# Patient Record
Sex: FEMALE | Race: WHITE | NOT HISPANIC OR LATINO | Employment: UNEMPLOYED | URBAN - METROPOLITAN AREA
[De-identification: names, ages, dates, MRNs, and addresses within clinical notes are randomized per-mention and may not be internally consistent; named-entity substitution may affect disease eponyms.]

---

## 2017-08-15 ENCOUNTER — ALLSCRIPTS OFFICE VISIT (OUTPATIENT)
Dept: OTHER | Facility: OTHER | Age: 32
End: 2017-08-15

## 2017-08-21 ENCOUNTER — LAB CONVERSION - ENCOUNTER (OUTPATIENT)
Dept: OTHER | Facility: OTHER | Age: 32
End: 2017-08-21

## 2017-08-21 LAB
ADDITIONAL INFORMATION (HISTORICAL): NORMAL
ADEQUACY: (HISTORICAL): NORMAL
COMMENT (HISTORICAL): NORMAL
CYTOTECHNOLOGIST: (HISTORICAL): NORMAL
HPV MRNA E6/E7 (HISTORICAL): NOT DETECTED
INTERPRETATION (HISTORICAL): NORMAL
LMP (HISTORICAL): NORMAL
PREV. BX: (HISTORICAL): NORMAL
PREV. PAP (HISTORICAL): NORMAL
SOURCE (HISTORICAL): NORMAL

## 2017-09-13 ENCOUNTER — GENERIC CONVERSION - ENCOUNTER (OUTPATIENT)
Dept: OTHER | Facility: OTHER | Age: 32
End: 2017-09-13

## 2017-09-14 ENCOUNTER — GENERIC CONVERSION - ENCOUNTER (OUTPATIENT)
Dept: OTHER | Facility: OTHER | Age: 32
End: 2017-09-14

## 2017-09-26 ENCOUNTER — HOSPITAL ENCOUNTER (OUTPATIENT)
Facility: HOSPITAL | Age: 32
Setting detail: OUTPATIENT SURGERY
Discharge: HOME | End: 2017-09-26
Attending: SURGERY | Admitting: SURGERY
Payer: COMMERCIAL

## 2017-09-26 VITALS
BODY MASS INDEX: 19.84 KG/M2 | RESPIRATION RATE: 16 BRPM | SYSTOLIC BLOOD PRESSURE: 104 MMHG | HEIGHT: 63 IN | TEMPERATURE: 99.1 F | OXYGEN SATURATION: 100 % | HEART RATE: 57 BPM | WEIGHT: 112 LBS | DIASTOLIC BLOOD PRESSURE: 73 MMHG

## 2017-09-26 DIAGNOSIS — D03.72 MELANOMA IN SITU OF LEFT LOWER EXTREMITY INCLUDING HIP (HCC): ICD-10-CM

## 2017-09-26 PROCEDURE — 88305 TISSUE EXAM BY PATHOLOGIST: CPT | Performed by: SURGERY

## 2017-09-26 RX ORDER — KETOROLAC TROMETHAMINE 10 MG/1
10 TABLET, FILM COATED ORAL ONCE
Status: COMPLETED | OUTPATIENT
Start: 2017-09-26 | End: 2017-09-26

## 2017-09-26 RX ADMIN — KETOROLAC TROMETHAMINE 10 MG: 10 TABLET, FILM COATED ORAL at 11:07

## 2017-09-26 NOTE — OP NOTE
OPERATIVE REPORT  PATIENT NAME: Diane Park    :  1985  MRN: [id number]  Pt Location: BE OR ROOM 06    SURGERY DATE: 2017    Surgeon(s) and Role:     * Vidya Anguiano MD - Primary     * Theodora Hitchcock MD - Assisting    Preop Diagnosis:  Melanoma in situ of left lower extremity including hip [D03 72]    Post-Op Diagnosis Codes:     * Melanoma in situ of left lower extremity including hip [D03 72]    Procedure(s) (LRB):  SHIN WIDE EXCISION MELANOMA SCAR (Left)    Specimen(s):  ID Type Source Tests Collected by Time Destination   1 : Left Shin Melanoma Resection  Tissue Leg, Left TISSUE EXAM Vidya Anguiano MD 2017 8105    2 : Left Leg Melanoma Resection- Final 6:00  Tissue Leg, Left TISSUE EXAM Vidya Anguiano MD 2017 1015    3 : Left Shin Melanoma Resection- Final 12:00 Tissue Leg, Left TISSUE EXAM Vidya Anguiano MD 2017 1016        Estimated Blood Loss:   Minimal    Drains:       Anesthesia Type:   Local    Operative Indications:  Melanoma in situ of left lower extremity including hip [D03 72]  Left anterolateral chin    Operative Findings:  Defect post excision: 5 0 x 2 0 x 0 5 cm    Complications:   None    Procedure and Technique:  Patient is a 69-year-old woman with a melanoma in situ on her left anterolateral shin  She comes for wide excision  She was brought into the OR and identified by a proper time-out  Following this she was prepped and draped with the left foot and shin exposed  Following this 5 mm margins were drawn around the visible lesion  The area was then thoroughly infiltrated with local anesthesia  The lines were incised sharply  Cautery was used dissect through the dermis and subcutaneous tissue to obtain a full-thickness excision  The specimen was oriented with sutures and clips  Resulting defect measured 5 0 x 2 0 x 0 5 cm in dimension    Closure was performed by undermining the skin, then using 3 0 Vicryl to close the dermis, followed by a 2 0 nylon to close the skin in vertical mattress fashion, followed by 4 0 Monocryl to close the skin in subcuticular fashion  Benzoin and Steri-Strips were then applied followed by dressings  Patient was then awakened and transferred to the recovery room in stable condition     I was present for the entire procedure    Patient Disposition:  PACU     SIGNATURE: Elizabeth Ramesh MD  DATE: September 26, 2017  TIME: 11:08 AM

## 2017-09-26 NOTE — DISCHARGE INSTRUCTIONS
Your appointment for Dr Maribel Cifuentes is 10/10/17 8:45am for suture removal  No strenuous activity until your office appointment   Ok to take advil or tylenol for pain  Ok to shower, remove outer dressing in two days  The white steri-strips underneath will come off on their own  The black suture will be removed in the office

## 2017-10-10 ENCOUNTER — GENERIC CONVERSION - ENCOUNTER (OUTPATIENT)
Dept: OTHER | Facility: OTHER | Age: 32
End: 2017-10-10

## 2018-01-09 NOTE — PROGRESS NOTES
2016         RE: Alfredo Fresh                          To: [facility]   MR#: [id number]                                  Eli Masterson   : [place]: [id number]                             Fax: [phone]   (Exam #: [id number])      The LMP of this 31year old,  G1, P0-0-0-0 patient was unknown, her   working NEFTALY is OCT 24 2016 and the current gestational age is 22 weeks 3   days by IV Fertilization  A sonographic examination was performed on 2016 using real time equipment  The ultrasound examination was   performed using abdominal technique  The patient has a BMI of 24 1  Her   blood pressure today was 105/67  Charissa Ket has no complaints  She reports fetal movements  She denies vaginal   bleeding or problems related to  labor  Cardiac motion was observed at 148 bpm       INDICATIONS      in vitro fertilization      Exam Types      Fetal Echocardiogram      RESULTS      Fetus # 1 of 1   Breech presentation   Placenta Location = Anterior, fundal   No placenta previa   Placenta Grade = I      AMNIOTIC FLUID         Largest Vertical Pocket = 4 3 cm   Amniotic Fluid: Normal      FETAL VESSELS                                     S/D   PI    RI    PSV   AEDV RF                                                    cm/s       Umbilical Artery                 0 85       Middle Cerebral Artery           1 82      FETAL VESSELS                                    PVSys PVDia PASys  S/D   S/A   DVI   RF       Ductus Venosus:                                                No      IMPRESSION      [facility]man IUP   22 weeks and 3 days by IV Fertilization  (NEFTALY=OCT 21 2016)   Breech presentation   Regular fetal heart rate of 148 bpm   Anterior, fundal placenta   No placenta previa      GENERAL COMMENT      Fetal echocardiography was performed for the indication of in vitro   fertilization    The heart is in normal anatomic position within the left   chest   All four chambers were identified with a normal, 45-degree   leftward axis  There is no suspicion of an echogenic intracardiac focus  Tricuspid regurgitation is not present  Right and left ventricular and   atrial sizes were concordant  The ventricular and atrial septi appear   intact by 2D echo and color Doppler  The aorta arises in normal anatomic   relationship from the left ventricle  The pulmonary artery arises in   normal anatomic relationship from the right ventricle  The short axis and   three vessel views appear normal   The ductus arterosis joins the aorta in   a normal anatomic relationship  The aortic arch, pulmonary veins,   inferior and superior vena cava, and the right and left ventricular   outflow tracts were identified and appear normal  The flow velocities   across the mitral, tricuspid, aortic, pulmonary valves, aortic arch and   ductus arteriosus, appear normal  There is no evidence of an anatomical   defect present within the heart  The fetal heart rate was regular   throughout the exam period  There is no suspicion of a fetal dysrhythmia  The umbilical and middle cerebral artery, and ductus venosus, Doppler   studies are normal       Today's ultrasound findings were discussed in detail with [facility]  She   was advised of the findings and counseled about the limitations of fetal   echocardiography in detecting all forms of congenital anomalies  For   example, fetal echocardiography may not detect small septal defects and   minor valvular abnormalities  Other examples of difficult    diagnosis include post valvular stenosis, coarctation of the aorta, and   anomalous pulmonary venous return  [facility] will return to the [address] [place] [address] at about 28 weeks gestation to assess interval growth        The face to face time, in addition to time spent discussing ultrasound   results, was 10 minutes, greater than 50% of which was spent during   counseling and coordination of care  VLAD Jain M D     Maternal-Fetal Medicine   Electronically signed 06/20/16 15:39

## 2018-01-10 NOTE — RESULT NOTES
Verified Results  (1) CBC/PLT/DIFF [id number] 09:40AM [place]     Test Name Result Flag Reference   WHITE BLOOD CELL COUNT 8 5 Thousand/uL  3 8-10 8   RED BLOOD CELL COUNT 4 12 Million/uL  3 80-5 10   HEMOGLOBIN 13 1 g/dL  11 7-15 5   HEMATOCRIT 40 5 %  35 0-45 0   MCV 98 5 fL  80 0-100 0   MCH 31 7 pg  27 0-33 0   MCHC 32 2 g/dL  32 0-36 0   RDW 14 3 %  11 0-15 0   PLATELET COUNT 640 Thousand/uL  140-400   MPV 10 8 fL  7 5-11 5   ABSOLUTE NEUTROPHILS 6554 cells/uL  1140-8241   ABSOLUTE LYMPHOCYTES 1420 cells/uL  850-3900   ABSOLUTE MONOCYTES 459 cells/uL  200-950   ABSOLUTE EOSINOPHILS 51 cells/uL     ABSOLUTE BASOPHILS 17 cells/uL  0-200   NEUTROPHILS 77 1 %     LYMPHOCYTES 16 7 %     MONOCYTES 5 4 %     EOSINOPHILS 0 6 %     BASOPHILS 0 2 %       (Q) GLUCOSE, GESTATIONAL SCREEN (50G)-130 CUTOFF [id number] 09:40AM [place]     Test Name Result Flag Reference   GLUCOSE, GESTATIONAL$SCREEN (50G)-130 CUTOFF 83 mg/dL  <135     (Q) RPR (MONITOR) W/REFL TITER [id number] 09:40AM Silvestre Hurtado   REPORT COMMENT:  FASTING:NO     Test Name Result Flag Reference   RPR (MONITOR)$W/REFL TITER NON-REACTIVE  NON-REACTIVE

## 2018-01-10 NOTE — PROGRESS NOTES
SEP 9 2016         RE: Nichol Lies                          To: [facility]   MR#: [id number]                                  5 danica [facility]   : [address] [place] [address] Avenue: [id number]                             Fax: [phone]   (Exam #: [id number])      The LMP of this 31year old,  G1, P0-0-0-0 patient was unknown, her   working NEFTALY is OCT 24 2016 and the current gestational age is 34 weeks 0   days by IV Fertilization  A sonographic examination was performed on SEP 9   2016 using real time equipment  The ultrasound examination was performed   using abdominal technique  The patient has a BMI of 24 8  Her blood   pressure today was 104/61  IVF pregnancy with an implantation date of a frozen embryo on 16   giving an NEFTALY of 10/21/16  She reports she had preimplantation genetics  Problem list:   1  History of an IVF pregnancy   2  History of ITP in  that resolved  Laast Plt count was 148,000 on   16  Cardiac motion was observed at 134 bpm       INDICATIONS      in vitro fertilization      Exam Types      Level I      RESULTS      Fetus # 1 of 1   Vertex presentation   Fetal growth appeared normal   Placenta Location = Anterior   No placenta previa   Placenta Grade = I      MEASUREMENTS (* Included In Average GA)      AC              30 5 cm        34 weeks 4 days* (58%)   BPD              8 7 cm        35 weeks 0 days* (63%)   HC              32 2 cm        35 weeks 6 days* (66%)   Femur            6 4 cm        33 weeks 0 days* (37%)      HC/AC           1 06   FL/AC           0 21   FL/BPD          0 74   EFW (Ac/Fl/Hc)  2417 grams - 5 lbs 5 oz                 (49%)      THE AVERAGE GESTATIONAL AGE is 34 weeks 4 days +/- 21 days        AMNIOTIC FLUID      Q1: 3 4      Q2: 4 3      Q3: 4 5      Q4: 3 9   JOSLYN Total = 16 1 cm   Amniotic Fluid: Normal      ANATOMY DETAILS      Visualized Appearing Sonographically Normal:   HEAD: (Calvarium, BPD Level, Cavum, Lateral Ventricles, Choroid Plexus);      TH  CAV : (Diaphragm); HEART: (Four Chamber View, Proximal Left   Outflow, 3 Vessel Trachea, Interventricular Septum, Interatrial Septum,   Cardiac Axis, Cardiac Position);    ABD  CAV , STOMACH, RIGHT KIDNEY, LEFT   KIDNEY, BLADDER, PLACENTA      Suboptimally Visualized:   HEAD: (Cerebellum, Cisterna Magna); HEART: (Proximal Right Outflow)      IMPRESSION      Harman IUP   34 weeks and 4 days by this ultrasound  (NEFTALY=OCT 17 2016)   34 weeks and 0 days by IV Fertilization  (NEFTALY=OCT 21 2016)   Vertex presentation   Fetal growth appeared normal   Regular fetal heart rate of 134 bpm   Anterior placenta   No placenta previa      GENERAL COMMENT      On exam today the patient appears well, in no acute distress, and denies   any complaints  Her abdomen is non-tender  There has been appropriate interval fetal growth  Good fetal movement and   tone are seen  The amniotic fluid volume appears normal   The placenta is   anterior and it appears sonographically normal   The anatomic structures   listed above could not be optimally imaged today because of fetal   position; however, these structures were seen on a prior ultrasound and   appeared sonographically normal   The patient was informed of today's   findings and all of her questions were answered  The limitations of   ultrasound were reviewed with the patient   labor precautions as   well as fetal kick counts were reviewed  The patient had questions regarding the safety of exercise in pregnancy   and overall I reviewed that exercise is safe and encouraged in pregnancy   as long as there are no complications such as fetal growth restriction or    labor    I reiterated that there is no evidence to suggest that a   mom needs to keep her heart rate bellow a certain level for fetal well   being and that moderate exercise is beneficial for both maternal and fetal   well being  Recommend further ultrasounds as clinically indicated  Thank you very much for allowing us to participate in the care of this   very nice patient  Should you have any questions, please do not hesitate   to contact our office  Please note, in addition to the time spent discussing the results of the   ultrasound, I spent approximately 10 minutes of face-to-face time with the   patient, greater than 50% of which was spent in counseling and the   coordination of care for this patient  VLAD King M D     Electronically signed 09/12/16 07:54

## 2018-01-11 NOTE — PROGRESS NOTES
JUL 15 2016         RE: Jimi Kasper                          To: [facility]   MR#: [id number]                                  5 danica Masterson   : [address]: [id number]                             Fax: [phone]   (Exam #: [id number])      The LMP of this 31year old,  G1, P0-0-0-0 patient was unknown, her   working NEFTALY is OCT 24 2016 and the current gestational age is 26 weeks 0   days by IV Fertilization  A sonographic examination was performed on JUL   15 2016 using real time equipment  The ultrasound examination was   performed using abdominal technique  The patient has a BMI of 24 3  Her   blood pressure today was 99/60  IVF pregnancy with an implantation date of a frozen embryo on 16   giving an NEFTALY of 10/21/16  She reports she had preimplantation genetics  Problem list:   1  History of an IVF pregnancy   2  History of ITP in  that resolved  Laast Plt count was 148,000 on   16  Cardiac motion was observed at 145 bpm       INDICATIONS      in vitro fertilization      Exam Types      Level I      RESULTS      Fetus # 1 of 1   Vertex presentation   Fetal growth appeared normal   Placenta Location = Anterior   No placenta previa   Placenta Grade = I      MEASUREMENTS (* Included In Average GA)      AC              22 2 cm        26 weeks 4 days* (56%)   BPD              7 0 cm        28 weeks 1 day * (88%)   HC              25 4 cm        27 weeks 2 days* (67%)   Femur            4 9 cm        26 weeks 4 days* (50%)      Cerebellum       3 0 cm        27 weeks 3 days   CisternaMagna    8 0 mm      HC/AC           1 14   FL/AC           0 22   FL/BPD          0 70   EFW (Ac/Fl/Hc)   978 grams - 2 lbs 2 oz                 (58%)      THE AVERAGE GESTATIONAL AGE is 27 weeks 1 day +/- 14 days        AMNIOTIC FLUID      Q1: 4 0      Q2: 3 7      Q3: 3 2      Q4: 4 0   JOSLYN Total = 14 9 cm   Amniotic Fluid: Normal      CERVICAL EVALUATION      SUPINE      Cervical Length: 3 90 cm      OTHER TEST RESULTS           Funneling?: No             Dynamic Changes?: No        Resp  To TFP?: No      ANATOMY DETAILS      Visualized Appearing Sonographically Normal:   HEAD: (Calvarium, BPD Level, Cavum, Lateral Ventricles, Choroid Plexus,   Cerebellum, Cisterna Magna); HEART: (Four Chamber View, Proximal Right   Outflow, Interventricular Septum, Interatrial Septum, Cardiac Axis,   Cardiac Position);    ABD  CAV , STOMACH, RIGHT KIDNEY, LEFT KIDNEY,   BLADDER, PLACENTA      IMPRESSION      Harman IUP   27 weeks and 1 day by this ultrasound  (NEFTALY=OCT 13 2016)   26 weeks and 0 days by IV Fertilization  (NEFTALY=OCT 21 2016)   Vertex presentation   Fetal growth appeared normal   Regular fetal heart rate of 145 bpm   Anterior placenta   No placenta previa      GENERAL COMMENT      An ultrasound was previously scheduled for 4 weeks  She had an episode of   pelvic pressure after intercourse that lasted between 1-2 days  She has no   other signs or symptoms now and would like to resume exercising ( running   which she was doing prior to pregnancy)  An ultrasound today   transabdominally shows a normal cervix length suggesting no evidence of    labor  We reviewed the signs and symptoms of  labor and   when to call her OB provider  At this time I feel she can resume her   running program      The face to face time, in addition to time spent discussing ultrasound   results, was 15 minutes, greater than 50% of which was spent during   counseling and coordination of care  VLAD Steward M D     Maternal-Fetal Medicine   Electronically signed 07/15/16 16:09

## 2018-01-14 NOTE — PROGRESS NOTES
Preliminary Nursing Report                Patient Information    Initial Encounter Entry Date:   2017 9:33 AM EST (Automated Transmission Automated Transmission)       Last Modified:   {Giulia Kong}              Legal Name: Bhanu [address] Number:        YOB: 1985        Age (years): 27        Gender: F        Body Mass Index (BMI): 20 kg/m2        Height: 63 in  Weight: 111 lbs (50 kgs)           Address:   [address] [place] [address]              Phone: [phone]   (consent to leave messages)        Email:        Ethnicity: Decline to State        Yazdanism:        Marital Status:        Preferred Language: English        Race: Other Race                    Patient Insurance Information        Primary Insurance Information Carrier Name: {Primary  CarrierName}           Carrier Address:   {Primary  CarrierAddress}              Carrier Phone: {Primary  CarrierPhone}          Group Number: {Primary  GroupNumber}          Policy Number: {Primary  PolicyNumber}          Insured Name: {Primary  InsuredName}          Insured : {Primary  InsuredDOB}          Relationship to Insured: {Primary  RelationshiptoInsured}           Secondary Insurance Information Carrier Name: {Secondary  CarrierName}           Carrier Address:   {Secondary  CarrierAddress}              Carrier Phone: {Secondary  CarrierPhone}          Group Number: {Secondary  GroupNumber}          Policy Number: {Secondary  PolicyNumber}          Insured Name: {Secondary  InsuredName}          Insured : {Secondary  InsuredDOB}          Relationship to Insured: {Secondary  RelationshiptoInsured}                       Health Profile   Booking #:   Isidro Cox #: [id number]               DOS: 2017    Surgery : REMOVAL OF SKIN LESION    Nichole Silver Procedures/Notes:     Surgery Risk: Minor          Precautions          Allergies    clindamycin       Dairy       Gluten Milk       Other       Seasonal       Clinical Comments: Reaction Type: , Reaction: , Severity:              Medications    CoQ-10 100 MG Oral Capsule       DHA CAPS       Prenatal TABS       Probiotic Oral Capsule       TraMADol HCl - 50 MG Oral Tablet               Conditions    Breast feeding status of mother       Encounter for annual routine gynecological examination       Hemorrhoid       Melanoma in situ of left lower extremity       Pregnancy resulting from in vitro fertilization in second trimester       Screening for STD (sexually transmitted disease)               Family History    None             Surgical History    None             Social History    Never a smoker                               Patient Instructions       Medical Procedure Risk  NPO Instructions   The day before surgery it is recommended to have a light dinner at your usual time and you are allowed a light snack early in the evening  Do not eat anything heavy or eat a big meal after 7pm  Do not eat or drink anything after midnight prior to your surgery  If you are supposed to take any of your medications, do so with a sip of water  Failure to follow these instructions can lead to an increased risk of lung complications and may result in a delay or cancellation of your procedure  If you have any questions, contact your institution for further instructions  No candy, no gum, no mints, no chewing tobacco          CoQ-10 100 MG Oral Capsule  Medication Instructions (Herbal Medications) 39, 40  Please stop the following medications one week prior to surgery  Testing Considerations       ? Pregnancy Test t  Consider a urine pregnancy test on the morning of surgery  Triggered by: Age or Facility Rec, Gender               Consultations       No recommendations for this classification               Miscellaneous Questions         Question: Are you able to walk up a flight of stairs, walk up a hill or do heavy housework WITHOUT having chest pain or shortness of breath? Answer: YES                   Allergies/Conditions/Medications Not Found        The following were not recognized by our system when generating the recommendations  Please consider if this would impact any preoperative protocols  ? Melanoma in situ of left lower extremity       ? Never a smoker       ? DHA CAPS       ? Prenatal TABS       ? TraMADol HCl - 50 MG Oral Tablet                  Appointment Information         Date:    09/26/2017        Location:    [place]        Address:           Directions:                      Footnotes revision 14      ?? Denotes a free-text entry  Legal Disclaimer: Any and all recommendations and services provided herein are designed to assist in the preoperative care of the patient  Nothing contained herein is designed to replace, eliminate or alleviate the responsibility of the attending physician to supervise and determine the patient?s preoperative care and course of treatment  Failure to provide complete, accurate information may negatively impact the system?s ability to recommend the proper preoperative protocol  THE ATTENDING PHYSICIAN IS RESPONSIBLE TO REVIEW THE SUGGESTED PREOPERATIVE PROTOCOLS/COURSE OF TREATMENT AND PRESCRIBE THE FINAL COURSE OF PREOPERATIVE TREATMENT IN CONSULTATION WITH THE PATIENT  THE ePREOP SYSTEM AND ITS MATERIALS ARE PROVIDED ? AS IS? WITHOUT WARRANTY OF ANY KIND, EXPRESS OR IMPLIED, INCLUDING, BUT NOT LIMITED TO, WARRANTIES OF PERFORMANCE OR MERCHANTABILITY OR FITNESS FOR A PARTICULAR PURPOSE  PATIENT AND PHYSICIANS HEREBY AGREE THAT THEIR USE OF THE MATERIALS AND RESOURCES ACT AS A CONSENT TO RELEASE AND WAIVE ePREOP FROM ANY AND ALL CLAIMS OF WARRANTY, TORT OR CONTRACT LAW OF ANY KIND             Electronically signed by:Roderick Donney Homans MD  Sep 20 2017 11:09AM EST

## 2018-01-15 VITALS
DIASTOLIC BLOOD PRESSURE: 68 MMHG | SYSTOLIC BLOOD PRESSURE: 104 MMHG | BODY MASS INDEX: 19.93 KG/M2 | WEIGHT: 112.5 LBS | HEIGHT: 63 IN

## 2018-01-16 NOTE — RESULT NOTES
Verified Results  (Q) HSV 1/2 IGG, HERPESELECT TYPE SPECIFIC AB [id number] 11:28AM Yuriy Pereyra     Test Name Result Flag Reference   HSV 1 IGG TYPE SPECIFIC$AB <0 90     HSV 2 IGG TYPE SPECIFIC$AB <0 90     Value          Interpretation                            -----          --------------                            <0 90          Negative                            0  90-1 10      Equivocal                            >1 10          Positive     This assay utilizes recombinant type-specific antigens  to differentiate HSV-1 from HSV-2 infections  A  positive result cannot distinguish between recent and  past infection  If recent HSV infection is suspected  but the results are negative or equivocal, the assay  should be repeated in 4-6 weeks  The performance  characteristics of the assay have not been established  for pediatric populations, immunocompromised patients,  or  screening

## 2018-01-17 NOTE — PROGRESS NOTES
APR 15 2016         RE: Nellie Funes                          To: [facility]   MR#: [id number]                                  5 Saniya Masterson   : [address]: [id number]                             Fax: [phone]   (Exam #: [id number])      The LMP of this 31year old,  1, para 0 patient was unknown, her   working NEFTALY is OCT 24 2016 and the current gestational age is 17 weeks 0   days by IV Fertilization  A sonographic examination was performed on APR   15 2016 using real time equipment  The ultrasound examination was   performed using abdominal technique  The patient has a BMI of 23 4  Her   blood pressure today was 105/58  Thank you very much for your kind referral of Nellie Funes to the   On license of [facility]  in [place] on April 15, 2016 for first trimester   ultrasound evaluation and  consult  Racquel Sharma is a 79-year-old   primigravida who is currently at 13-0/7 weeks gestation by an estimated   due date of 2016  She conceived this pregnancy via in vitro   fertilization, as managed by [facility] in [facility]  Her prenatal   course has been unremarkable so far  She experiences occasional nausea,   but no vomiting  Racquel Sharma denies vaginal bleeding  She has an occasional   nosebleed and headache  Otherwise, she feels well  By history,   preimplantation genetic diagnosis was normal  We do not have a copy of   that report available for review at the time of her visit today  Racquel Sharma has a past medical history significant for a diagnosis of ITP in   , treated with steroids  She no longer requires Hematology followup   for this indication and has not been treated for the indication of ITP for   many years  An initial platelet count obtained 11 days ago was normal at   172,000  Her past medical history is otherwise unremarkable   Her past   surgical history is significant for an ovarian cyst removal  Aleksandr Ayoub   denies tobacco, alcohol, or illicit drug use during the pregnancy  Her dad   has hypertension  The family medical history is otherwise negative with   respect to first degree relatives with hypertension, diabetes, or venous   thromboembolism  Her  has a cousin with Down syndrome  The family   genetic history is otherwise negative with respect to genetic   abnormalities, birth defects, or mental retardation  Multiple longitudinal and transverse sections revealed a denis   intrauterine pregnancy with the fetus in variable presentation  The   placenta is anterior in implantation  Cardiac motion was observed at 153 bpm       INDICATIONS      in vitro fertilization      Exam Types      Level I      RESULTS      Fetus # 1 of 1   Variable presentation   Fetal growth appeared normal      MEASUREMENTS (* Included In Average GA)      CRL              6 8 cm        12 weeks 6 days*   Nuchal Trans    1 20 mm      THE AVERAGE GESTATIONAL AGE is 12 weeks 6 days +/- 7 days  UTERINE ARTERIES                                  S/D   PI    RI    NOTCH       Left Uterine Artery              1 15       Right Uterine Artery             0 87      ANATOMY COMMENTS      Anatomic detail is limited at this gestational age  The yolk sac was not   noted  The fetal cranium appeared normal in shape and the nuchal   translucency was normal in size at 1 2 mm  The nasal bone appears to be   present  Evaluation of the spine revealed no obvious evidence for a   neural tube defect  Anatomy of the fetal thorax appeared within normal   limits  The cardiac axis appears normal  The 3 vessel tracheal views   normal  The cardiac rhythm was regular  Within the abdomen, stomach &   bladder were visualized and the abdominal wall appeared intact  A three   vessel cord appears to be present  Active movement of the fetal body &   extremities was seen    There is no suspicion of a subchorionic bleed   The   placental cord insertion was normal     The uterine artery Dopplers are   normal for this gestational age  There is no suspicion of a uterine myoma  Free fluid is not seen in the posterior cul-de-sac  ADNEXA      The left ovary was not visualized  The right ovary was not visualized  AMNIOTIC FLUID      Largest Vertical Pocket = 4 3 cm   Amniotic Fluid: Normal      IMPRESSION      [facility] IUP   12 weeks and 6 days by this ultrasound  (NEFTALY=OCT 22 2016)   13 weeks and 0 days by IV Fertilization  (NEFTALY=OCT 21 2016)   Variable presentation   Fetal growth appeared normal   Regular fetal heart rate of 153 bpm   Anterior placenta      GENERAL COMMENT      Today's ultrasound findings and suggested follow-up were discussed in   detail with Abdulaziz Villa and her   She will return to the [facility] at 20 weeks gestation for level II ultrasound evaluation  We   discussed that pregnancies conceived via in vitro fertilization have an   increased risk for fetal structural cardiac abnormalities  Accordingly,   fetal echocardiography will be performed in the [address] [place] [address] at about   23 weeks gestation  Abdulaziz Villa will try to obtain a copy of PGD results from   her infertility specialist  I also recommended fetal growth scans at 28   and 34 weeks gestation to assess interval growth  Periodic evaluation of   her platelet count should be performed given her history, though distant,   of a diagnosis of ITP  The face to face time, in addition to time spent discussing ultrasound   results, was 15 minutes, greater than 50% of which was spent during   counseling and coordination of care  VLAD Dennison M D     Maternal-Fetal Medicine   Electronically signed 04/16/16 10:40

## 2018-01-22 VITALS
TEMPERATURE: 97.7 F | BODY MASS INDEX: 20.08 KG/M2 | WEIGHT: 113.31 LBS | HEIGHT: 63 IN | SYSTOLIC BLOOD PRESSURE: 110 MMHG | DIASTOLIC BLOOD PRESSURE: 60 MMHG | HEART RATE: 67 BPM | RESPIRATION RATE: 14 BRPM | OXYGEN SATURATION: 97 %

## 2018-01-22 VITALS
HEART RATE: 60 BPM | BODY MASS INDEX: 19.67 KG/M2 | WEIGHT: 111 LBS | RESPIRATION RATE: 14 BRPM | OXYGEN SATURATION: 98 % | DIASTOLIC BLOOD PRESSURE: 70 MMHG | TEMPERATURE: 98.5 F | SYSTOLIC BLOOD PRESSURE: 120 MMHG | HEIGHT: 63 IN

## 2018-04-25 ENCOUNTER — OFFICE VISIT (OUTPATIENT)
Dept: SURGICAL ONCOLOGY | Facility: CLINIC | Age: 33
End: 2018-04-25
Payer: COMMERCIAL

## 2018-04-25 VITALS
BODY MASS INDEX: 19.49 KG/M2 | RESPIRATION RATE: 16 BRPM | SYSTOLIC BLOOD PRESSURE: 96 MMHG | TEMPERATURE: 97.8 F | DIASTOLIC BLOOD PRESSURE: 62 MMHG | HEART RATE: 64 BPM | WEIGHT: 110 LBS | HEIGHT: 63 IN

## 2018-04-25 DIAGNOSIS — D03.72 MELANOMA IN SITU OF LEFT LOWER EXTREMITY (HCC): Primary | ICD-10-CM

## 2018-04-25 PROCEDURE — 99214 OFFICE O/P EST MOD 30 MIN: CPT | Performed by: SURGERY

## 2018-04-25 NOTE — LETTER
April 25, 2018     Sonja Oliver DO  [address]  [address]  [facility] [address]    Patient: Sandra Keenan   YOB: 1985   Date of Visit: 4/25/2018       Dear Dr Makayla Suarez: Thank you for referring Sandra Keenan to me for evaluation  Below are my notes for this consultation  If you have questions, please do not hesitate to call me  I look forward to following your patient along with you  Sincerely,        Kimi Brito MD        CC: MD Aidan Trinidad CRNP Dareen Petit, DO Jene Bottom, MD Mylo Bastos, MD Farrel Belling, MD Pincus Daring, MD Durwood Bang, MD  4/25/2018  9:15 AM  Sign at close encounter     Surgical Oncology Follow Up       [address] [place] [address] [address]  1985  [phone]  [address] [place] [address]    No chief complaint on file  Assessment/Plan:    No problem-specific Assessment & Plan notes found for this encounter  There are no diagnoses linked to this encounter  Advance Care Planning/Advance Directives:  Discussed disease status, cancer treatment plans and/or cancer treatment goals with the patient  No history exists  History of Present Illness:  23-year-old woman here for a surveillance visit 6 months post excision of a melanoma in situ from her left shin     -Interval History:She has done well and has no complaints to report  She follows with Dr Tim Blanca since in for skin surveillance  She has several moles which he is following  Review of Systems:  Review of Systems   Constitutional: Negative  HENT: Negative  Eyes: Negative  Respiratory: Negative  Cardiovascular: Negative  Gastrointestinal: Negative  Endocrine: Negative  Genitourinary: Negative      Musculoskeletal: Negative  Skin: Negative  Allergic/Immunologic: Negative  Neurological: Negative  Hematological: Negative  Psychiatric/Behavioral: Negative  Patient Active Problem List   Diagnosis    39 weeks gestation of pregnancy    Vacuum extractor delivery, delivered    Melanoma in situ of lower leg, left ([address] )     Past Medical History:   Diagnosis Date    Abnormal Pap smear of cervix     HPV colpo 2003, wnl since per pt     Anemia     Complication of anesthesia     Nausea at times    Female infertility     Herpes     pt had one lesion years ago 2010 and reports md said was herpes     ITP (idiopathic thrombocytopenic purpura)     resolved 1994    Migraine     Varicella     had as child      Past Surgical History:   Procedure Laterality Date    COLPOSCOPY      CYST REMOVAL      ovarian cyst removed 2000    [place] EXC SKIN MALIG <0 5 CM TRUNK,ARM,LEG Left 9/26/2017    Procedure: SHIN WIDE EXCISION MELANOMA SCAR;  Surgeon: Cristi Vela MD;  [facility];  Service: Surgical Oncology    WISDOM TOOTH EXTRACTION       Family History   Problem Relation Age of Onset    Heart disease Maternal Grandmother     Heart disease Maternal Grandfather     Cancer Maternal Grandfather     Heart disease Paternal Grandfather     Mental retardation Other     Cancer Other      Social History     Social History    Marital status: /Civil Union     Spouse name: N/A    Number of children: N/A    Years of education: N/A     Occupational History    Not on file       Social History Main Topics    Smoking status: Never Smoker    Smokeless tobacco: Never Used    Alcohol use No    Drug use: No    Sexual activity: Yes     Partners: Male     Other Topics Concern    Not on file     Social History Narrative    No narrative on file       Current Outpatient Prescriptions:     acetaminophen (TYLENOL) 325 mg tablet, Take 2 tablets by mouth every 6 (six) hours as needed for headaches or fever, Disp: 30 tablet, Rfl: 0    Coenzyme Q10 (COQ-10 PO), Take 1 tablet by mouth daily  , Disp: , Rfl:     Docosahexaenoic Acid (DHA PO), Take 1 tablet by mouth daily  , Disp: , Rfl:     Folic Acid 20 MG CAPS, Take 100 mg by mouth daily  , Disp: , Rfl:     Prenatal Vit-Fe Fumarate-FA (PRENATAL PO), Take 1 tablet by mouth daily  , Disp: , Rfl:     Probiotic Product (PROBIOTIC DAILY PO), Take 1 tablet by mouth daily  , Disp: , Rfl:     Pyridoxine HCl (VITAMIN B-6 PO), Take 1 tablet by mouth daily  , Disp: , Rfl:   Allergies   Allergen Reactions    Gluten Meal     Lactase     Clindamycin Rash     Vitals:    04/25/18 0902   BP: 96/62   Pulse: 64   Resp: 16   Temp: 97 8 °F (36 6 °C)       Physical Exam   Constitutional: She is oriented to person, place, and time  HENT:   Head: Normocephalic and atraumatic  Right Ear: External ear normal    Left Ear: External ear normal    Eyes: Conjunctivae and EOM are normal  Pupils are equal, round, and reactive to light  Neck: Normal range of motion  Neck supple  Cardiovascular: Normal rate, regular rhythm, normal heart sounds and intact distal pulses  Pulmonary/Chest: Effort normal and breath sounds normal    Abdominal: Soft  Bowel sounds are normal    Musculoskeletal: Normal range of motion  Neurological: She is alert and oriented to person, place, and time  Skin: Skin is warm and dry  Left shin excision site without evidence of disease recurrence visible or palpable  Psychiatric: She has a normal mood and affect  Her behavior is normal  Judgment and thought content normal          Results:  Labs:  CBC, Coags, BMP, Mg, Phos     Imaging  No results found  I reviewed the above laboratory and imaging data  Discussion/Summary:  Melanoma in situ excised from left shin  No evidence of recurrence  Follow up with Dr Fay Dobson for surveillance lifelong

## 2018-04-25 NOTE — PROGRESS NOTES
Surgical Oncology Follow Up       [address] [facility]  [facility] [place] [address]  1985  [phone]  [address] [facility] [facility]  [facility]  [address] [address]    No chief complaint on file  Assessment/Plan:    No problem-specific Assessment & Plan notes found for this encounter  There are no diagnoses linked to this encounter  Advance Care Planning/Advance Directives:  Discussed disease status, cancer treatment plans and/or cancer treatment goals with the patient  No history exists  History of Present Illness:  76-year-old woman here for a surveillance visit 6 months post excision of a melanoma in situ from her left shin     -Interval History:She has done well and has no complaints to report  She follows with Dr Earle Reece since in for skin surveillance  She has several moles which he is following  Review of Systems:  Review of Systems   Constitutional: Negative  HENT: Negative  Eyes: Negative  Respiratory: Negative  Cardiovascular: Negative  Gastrointestinal: Negative  Endocrine: Negative  Genitourinary: Negative  Musculoskeletal: Negative  Skin: Negative  Allergic/Immunologic: Negative  Neurological: Negative  Hematological: Negative  Psychiatric/Behavioral: Negative          Patient Active Problem List   Diagnosis    39 weeks gestation of pregnancy    Vacuum extractor delivery, delivered    Melanoma in situ of lower leg, left ([address] )     Past Medical History:   Diagnosis Date    Abnormal Pap smear of cervix     HPV colpo 2003, wnl since per pt     Anemia     Complication of anesthesia     Nausea at times    Female infertility     Herpes     pt had one lesion years ago 2010 and reports md said was herpes     ITP (idiopathic thrombocytopenic purpura)     resolved 1994    Migraine     Varicella     had as child      Past Surgical History:   Procedure Laterality Date    COLPOSCOPY      CYST REMOVAL      ovarian cyst removed 2000    [place] EXC SKIN MALIG <0 5 CM TRUNK,ARM,LEG Left 9/26/2017    Procedure: Rudi Dave;  Surgeon: Cruzito Little MD;  [facility];  Service: Surgical Oncology    WISDOM TOOTH EXTRACTION       Family History   Problem Relation Age of Onset    Heart disease Maternal Grandmother     Heart disease Maternal Grandfather     Cancer Maternal Grandfather     Heart disease Paternal Grandfather     Mental retardation Other     Cancer Other      Social History     Social History    Marital status: /Civil Union     Spouse name: N/A    Number of children: N/A    Years of education: N/A     Occupational History    Not on file  Social History Main Topics    Smoking status: Never Smoker    Smokeless tobacco: Never Used    Alcohol use No    Drug use: No    Sexual activity: Yes     Partners: Male     Other Topics Concern    Not on file     Social History Narrative    No narrative on file       Current Outpatient Prescriptions:     acetaminophen (TYLENOL) 325 mg tablet, Take 2 tablets by mouth every 6 (six) hours as needed for headaches or fever, Disp: 30 tablet, Rfl: 0    Coenzyme Q10 (COQ-10 PO), Take 1 tablet by mouth daily  , Disp: , Rfl:     Docosahexaenoic Acid (DHA PO), Take 1 tablet by mouth daily  , Disp: , Rfl:     Folic Acid 20 MG CAPS, Take 100 mg by mouth daily  , Disp: , Rfl:     Prenatal Vit-Fe Fumarate-FA (PRENATAL PO), Take 1 tablet by mouth daily  , Disp: , Rfl:     Probiotic Product (PROBIOTIC DAILY PO), Take 1 tablet by mouth daily  , Disp: , Rfl:     Pyridoxine HCl (VITAMIN B-6 PO), Take 1 tablet by mouth daily    , Disp: , Rfl:   Allergies   Allergen Reactions    Gluten Meal     Lactase     Clindamycin Rash     Vitals:    04/25/18 0902   BP: 96/62   Pulse: 64   Resp: 16   Temp: 97 8 °F (36 6 °C) Physical Exam   Constitutional: She is oriented to person, place, and time  HENT:   Head: Normocephalic and atraumatic  Right Ear: External ear normal    Left Ear: External ear normal    Eyes: Conjunctivae and EOM are normal  Pupils are equal, round, and reactive to light  Neck: Normal range of motion  Neck supple  Cardiovascular: Normal rate, regular rhythm, normal heart sounds and intact distal pulses  Pulmonary/Chest: Effort normal and breath sounds normal    Abdominal: Soft  Bowel sounds are normal    Musculoskeletal: Normal range of motion  Neurological: She is alert and oriented to person, place, and time  Skin: Skin is warm and dry  Left shin excision site without evidence of disease recurrence visible or palpable  Psychiatric: She has a normal mood and affect  Her behavior is normal  Judgment and thought content normal          Results:  Labs:  CBC, Coags, BMP, Mg, Phos     Imaging  No results found  I reviewed the above laboratory and imaging data  Discussion/Summary:  Melanoma in situ excised from left shin  No evidence of recurrence  Follow up with Dr Whitney Hay for surveillance lifelong

## 2018-05-22 ENCOUNTER — OFFICE VISIT (OUTPATIENT)
Dept: OBGYN CLINIC | Facility: CLINIC | Age: 33
End: 2018-05-22
Payer: COMMERCIAL

## 2018-05-22 VITALS
DIASTOLIC BLOOD PRESSURE: 70 MMHG | SYSTOLIC BLOOD PRESSURE: 96 MMHG | BODY MASS INDEX: 19.31 KG/M2 | WEIGHT: 109 LBS | HEIGHT: 63 IN

## 2018-05-22 DIAGNOSIS — N89.8 VAGINAL IRRITATION: Primary | ICD-10-CM

## 2018-05-22 PROBLEM — D03.72 MELANOMA IN SITU OF LEFT LOWER EXTREMITY (HCC): Status: ACTIVE | Noted: 2017-09-13

## 2018-05-22 PROCEDURE — 99213 OFFICE O/P EST LOW 20 MIN: CPT | Performed by: OBSTETRICS & GYNECOLOGY

## 2018-05-22 RX ORDER — BIOTIN 1 MG
TABLET ORAL DAILY
COMMUNITY

## 2018-05-22 NOTE — PROGRESS NOTES
Renata Martin is a 34 y o  female who presents for evaluation of an external irritation  Symptoms have been present for 2 days  Vaginal symptoms: local irritation  Patient recently shaved  She normally waxesContraception: none  She denies blisters, bumps, burning, discharge, lesions, odor, pain and urinary symptoms of chills, dysuria, urinary frequency and urinary hesitancy She is in a new relationship and had intercourse unprotected once and is concerned  She would like information on birth control options  Menstrual flow: absent  She is still nursing  The following portions of the patient's history were reviewed and updated as appropriate: allergies, current medications, past family history, past medical history, past social history, past surgical history and problem list     Review of Systems  Pertinent items are noted in HPI       Objective     Physical Exam   Constitutional: Vital signs are normal  She appears well-developed and well-nourished  Genitourinary: Vagina normal  Pelvic exam was performed with patient supine  There is no rash, tenderness, lesion or injury on the right labia  There is no rash, tenderness, lesion or injury on the left labia  Cervix is parous  Cervix does not exhibit motion tenderness, lesion, discharge, friability, polyp or nabothian cyst    Neck: Neck supple  Neurological: She is alert  Skin: Skin is warm  Nursing note and vitals reviewed  Assessment/Plan   Vivienne Muniz was seen today for vaginal itching  Diagnoses and all orders for this visit:    Vaginal irritation      Patient informed of normal exam findings  A culture was obtained  We will call her with the results  She was [facility]

## 2018-05-26 LAB
A VAGINAE DNA VAG NAA+PROBE-LOG#: NOT DETECTED LOG (CELLS/ML)
C GLABRATA DNA VAG QL NAA+PROBE: NOT DETECTED
C TRACH RRNA SPEC QL NAA+PROBE: NOT DETECTED
CANDIDA DNA VAG QL NAA+PROBE: NOT DETECTED
G VAGINALIS DNA VAG NAA+PROBE-LOG#: 6 LOG (CELLS/ML)
LACTOBACILLUS DNA VAG NAA+PROBE-LOG#: NOT DETECTED LOG (CELLS/ML)
MEGASPHAERA SP DNA VAG NAA+PROBE-LOG#: NOT DETECTED LOG (CELLS/ML)
N GONORRHOEA RRNA SPEC QL NAA+PROBE: NOT DETECTED
SL AMB BV CATEGORY:: ABNORMAL
SL AMB C. PARAPSILOSIS, DNA: NOT DETECTED
SL AMB C. TROPICALIS, DNA: NOT DETECTED
T VAGINALIS RRNA SPEC QL NAA+PROBE: NOT DETECTED

## 2018-05-29 ENCOUNTER — TELEPHONE (OUTPATIENT)
Dept: OBGYN CLINIC | Facility: CLINIC | Age: 33
End: 2018-05-29

## 2018-05-29 DIAGNOSIS — N76.0 BACTERIAL VAGINOSIS: Primary | ICD-10-CM

## 2018-05-29 DIAGNOSIS — B96.89 BACTERIAL VAGINOSIS: Primary | ICD-10-CM

## 2018-05-29 RX ORDER — METRONIDAZOLE 500 MG/1
500 TABLET ORAL EVERY 12 HOURS SCHEDULED
Qty: 10 TABLET | Refills: 0 | Status: SHIPPED | OUTPATIENT
Start: 2018-05-29 | End: 2018-06-03

## 2018-05-29 NOTE — TELEPHONE ENCOUNTER
Patient informed of results and treatment with Flagyl  It was recommended that her partner be treated as well  All questions and concerns addressed and patient verbalized understanding

## 2018-06-11 ENCOUNTER — TELEPHONE (OUTPATIENT)
Dept: OBGYN CLINIC | Facility: CLINIC | Age: 33
End: 2018-06-11

## 2018-06-11 NOTE — TELEPHONE ENCOUNTER
Patient would like to schedule iud insertion, she does not get periods due to annovulatory cycles, unsure how to schedule the insertion for her

## 2018-08-06 ENCOUNTER — ANNUAL EXAM (OUTPATIENT)
Dept: OBGYN CLINIC | Facility: CLINIC | Age: 33
End: 2018-08-06
Payer: COMMERCIAL

## 2018-08-06 VITALS
HEIGHT: 63 IN | BODY MASS INDEX: 17.75 KG/M2 | SYSTOLIC BLOOD PRESSURE: 94 MMHG | DIASTOLIC BLOOD PRESSURE: 70 MMHG | WEIGHT: 100.2 LBS

## 2018-08-06 DIAGNOSIS — Z01.419 WOMEN'S ANNUAL ROUTINE GYNECOLOGICAL EXAMINATION: Primary | ICD-10-CM

## 2018-08-06 PROCEDURE — 99395 PREV VISIT EST AGE 18-39: CPT | Performed by: OBSTETRICS & GYNECOLOGY

## 2018-08-06 NOTE — PROGRESS NOTES
This is a 50-year-old white female she is a  1 para 1 1 vaginal delivery approximately 2 years ago  She is now in the process of a divorce for custody veronica  She has not relationship  She is still nursing at night  She is still amenorrhea  She has lost approximately 10% of weight since her last time she was examined by me  Overall she is doing well  There is some with stress and dealing with the father of her child  She denies any major  GI complaint  She denies any problem hot flashes or night sweats  She is not taking any prescription medication besides vitamins the present time  She has a dentist on a regular basis  She denies any problem with depression or anxiety  There are no new major family illnesses report at this time  Review of systems negative  Medical problems negative      Social history negative for tobacco positive social alcohol      Surgical history is negative      Family history noncontributory      Physical exam well-developed well-nourished petite thin white female  HEENT is was within normal limits  Cardiac exam shows a regular rhythm and rate  Lungs are clear to auscultation  Breast exam small petite no retraction or dimpling consistent with nursing  Abdomen soft flat nontender positive bowel sound  Pelvic exam the external genitalia normal limits the vagina is clean the Pap smear was performed  Uterus is anterior normal size is no cervical motion tenderness  Adnexa clear bilaterally  Impression stable gyn examination  I am concerned about the stress she still within her custody veronica  I am a little concerned about her weight loss  She is running and is still nursing at night  This might be a cause for the weight loss  Did discussion about her IUD which is ordered  She having time she desires  She will let us know her decision  She should return my office in 1 year

## 2018-08-06 NOTE — PATIENT INSTRUCTIONS
Patient was informed of a stable gyn examination  A Pap smear was performed  She may consider insertion of IUD at a later date  She is no longer in a relationship at the present time  Will continue to watch her weight loss

## 2018-08-13 LAB
CLINICAL INFO: ABNORMAL
CYTO CVX: ABNORMAL
CYTOLOGY CMNT CVX/VAG CYTO-IMP: ABNORMAL
DATE PREVIOUS BX: ABNORMAL
HPV E6+E7 MRNA CVX QL NAA+PROBE: DETECTED
LMP START DATE: ABNORMAL
SL AMB PREV. PAP:: ABNORMAL
SPECIMEN SOURCE CVX/VAG CYTO: ABNORMAL

## 2018-08-27 ENCOUNTER — PROCEDURE VISIT (OUTPATIENT)
Dept: OBGYN CLINIC | Facility: CLINIC | Age: 33
End: 2018-08-27
Payer: COMMERCIAL

## 2018-08-27 VITALS
DIASTOLIC BLOOD PRESSURE: 64 MMHG | BODY MASS INDEX: 18.54 KG/M2 | SYSTOLIC BLOOD PRESSURE: 90 MMHG | HEIGHT: 63 IN | WEIGHT: 104.6 LBS

## 2018-08-27 DIAGNOSIS — R87.618 ABNORMAL PAPANICOLAOU SMEAR OF CERVIX WITH POSITIVE HUMAN PAPILLOMA VIRUS (HPV) TEST: Primary | ICD-10-CM

## 2018-08-27 PROCEDURE — 57454 BX/CURETT OF CERVIX W/SCOPE: CPT | Performed by: OBSTETRICS & GYNECOLOGY

## 2018-08-27 NOTE — PATIENT INSTRUCTIONS
Abnormal Pap smear for positive HPV  The patient tolerated procedure well  Probable low-grade PASQUALE  The patient will return to office in 2 weeks for discussion of pathology

## 2018-08-27 NOTE — PROGRESS NOTES
Colposcopy  Date/Time: 8/27/2018 4:03 PM  Performed by: Toya Leija  Authorized by: Toya Leija     Consent:     Consent obtained:  Written    Consent given by:  Patient    Procedural risks discussed:  Bleeding, failure rate and infection    Patient questions answered: yes      Patient agrees, verbalizes understanding, and wants to proceed: yes      Educational handouts given: no    Universal protocol:     Patient states understanding of procedure being performed: yes      Relevant documents present and verified: yes      Test results available and properly labeled: yes      Imaging studies available: yes      Required blood products, implants, devices, and special equipment available: no      Site marked: no    Pre-procedure:     Pre-procedure timeout performed: yes      Prepped with: acetic acid    Indication:     Indication:  ASC-H  Procedure:     Procedure: Colposcopy w/ cervical biopsy and ECC      Under satisfactory analgesia the patient was prepped and draped in the dorsal lithotomy position: yes      [place] speculum was placed in the vagina: yes      Under colposcopic examination the transition zone was seen in entirety: yes      Intracervical block was performed: no      Endocervix was curetted using a Kevorkian curette: no      Cervical biopsy performed with a cervical biopsy punch: yes      Tampon inserted: no      Monsel's solution was applied: yes      Biopsy(s): yes      Location:  Biopsies were taken at 6:00 position 11:00 position 12:00 position and 1:00 position for white epithelium  Post-procedure:     Findings: White epithelium    Comments: This patient had a colposcopy for Pap smear was positive for HPV without any dysplasia  She has a history of HPV in the past   For biopsies taken at the 6 o'clock level clot 12 o'clock and 1 o'clock positions  For white epithelium  The bleeding was controlled with Monsel's  An ECC was also performed   The patient tolerated the procedure well

## 2018-08-31 LAB
CLINICAL INFO: NORMAL
PATH REPORT.FINAL DX SPEC: NORMAL
PATH REPORT.FINAL DX SPEC: NORMAL
PROCEDURE TYPE: NORMAL
SPECIMEN SOURCE: NORMAL

## 2018-09-11 ENCOUNTER — OFFICE VISIT (OUTPATIENT)
Dept: OBGYN CLINIC | Facility: CLINIC | Age: 33
End: 2018-09-11
Payer: COMMERCIAL

## 2018-09-11 VITALS
HEIGHT: 63 IN | BODY MASS INDEX: 18.75 KG/M2 | DIASTOLIC BLOOD PRESSURE: 70 MMHG | SYSTOLIC BLOOD PRESSURE: 100 MMHG | WEIGHT: 105.8 LBS

## 2018-09-11 DIAGNOSIS — R87.610 ATYPICAL SQUAMOUS CELLS OF UNDETERMINED SIGNIFICANCE ON CYTOLOGIC SMEAR OF CERVIX (ASC-US): Primary | ICD-10-CM

## 2018-09-11 PROCEDURE — 99213 OFFICE O/P EST LOW 20 MIN: CPT | Performed by: OBSTETRICS & GYNECOLOGY

## 2018-09-11 NOTE — PROGRESS NOTES
The patient reports for follow-up for abnormal Pap smear that was read as ASCUS positive HPV  The biopsy showed only squamous atypia with no every a of dysplasia  This was reported to the patient  Will now repeat a Pap smear in 6 months  No therapy all questions were answered

## 2018-09-11 NOTE — PATIENT INSTRUCTIONS
The patient was informed of benign cervical biopsies consistent with squamous atypia  Not conclusive for HPV  No therapy for now repeat a Pap smear in 6 months

## 2019-04-08 ENCOUNTER — OFFICE VISIT (OUTPATIENT)
Dept: OBGYN CLINIC | Facility: CLINIC | Age: 34
End: 2019-04-08
Payer: COMMERCIAL

## 2019-04-08 VITALS
SYSTOLIC BLOOD PRESSURE: 94 MMHG | BODY MASS INDEX: 18.61 KG/M2 | WEIGHT: 105 LBS | DIASTOLIC BLOOD PRESSURE: 68 MMHG | HEIGHT: 63 IN

## 2019-04-08 DIAGNOSIS — R87.610 ATYPICAL SQUAMOUS CELLS OF UNDETERMINED SIGNIFICANCE ON CYTOLOGIC SMEAR OF CERVIX (ASC-US): Primary | ICD-10-CM

## 2019-04-08 PROCEDURE — 99213 OFFICE O/P EST LOW 20 MIN: CPT | Performed by: OBSTETRICS & GYNECOLOGY

## 2019-04-08 RX ORDER — FERROUS SULFATE 325(65) MG
325 TABLET ORAL
COMMUNITY
Start: 2019-01-17 | End: 2021-10-27

## 2019-04-12 LAB
CLINICAL INFO: NORMAL
CYTO CVX: NORMAL
CYTOLOGY CMNT CVX/VAG CYTO-IMP: NORMAL
DATE PREVIOUS BX: NORMAL
HPV E6+E7 MRNA CVX QL NAA+PROBE: NOT DETECTED
LMP START DATE: NORMAL
SL AMB PREV. PAP:: NORMAL
SPECIMEN SOURCE CVX/VAG CYTO: NORMAL

## 2019-12-11 ENCOUNTER — ANNUAL EXAM (OUTPATIENT)
Dept: OBGYN CLINIC | Facility: CLINIC | Age: 34
End: 2019-12-11
Payer: COMMERCIAL

## 2019-12-11 VITALS
BODY MASS INDEX: 18.71 KG/M2 | WEIGHT: 105.6 LBS | DIASTOLIC BLOOD PRESSURE: 64 MMHG | SYSTOLIC BLOOD PRESSURE: 90 MMHG | HEIGHT: 63 IN

## 2019-12-11 DIAGNOSIS — Z01.419 WOMEN'S ANNUAL ROUTINE GYNECOLOGICAL EXAMINATION: Primary | ICD-10-CM

## 2019-12-11 PROCEDURE — G0145 SCR C/V CYTO,THINLAYER,RESCR: HCPCS | Performed by: PATHOLOGY

## 2019-12-11 PROCEDURE — 99395 PREV VISIT EST AGE 18-39: CPT | Performed by: OBSTETRICS & GYNECOLOGY

## 2019-12-11 PROCEDURE — 87624 HPV HI-RISK TYP POOLED RSLT: CPT | Performed by: OBSTETRICS & GYNECOLOGY

## 2019-12-11 PROCEDURE — G0124 SCREEN C/V THIN LAYER BY MD: HCPCS | Performed by: PATHOLOGY

## 2019-12-11 RX ORDER — MULTIVITAMIN
1 TABLET ORAL DAILY
COMMUNITY

## 2019-12-11 NOTE — PROGRESS NOTES
Assessment/Plan:    The patient was informed of a stable gyn examination  A Pap smear was performed  She is not sexually active irregular menstrual cycles  She return my office in 1 year  Subjective:      Patient ID: Vesta Fulton is a 27 y o  female  HPI    This is a 54-year-old white female, she is a  1 para 1 with 1 vaginal delivery approximately 3 years ago  Her menstrual cycles are regular and predictable  She is now  in the midst of a custody veronica  This is sometimes stressful for her  She has not relation with the present time  She is happy with her weight  Denies any problem depression or anxiety  Denies any major  GI complaint  There are no new major family illnesses report this time  The following portions of the patient's history were reviewed and updated as appropriate: allergies, current medications, past family history, past medical history, past social history, past surgical history and problem list     Review of Systems   All other systems reviewed and are negative  Objective:      BP 90/64   Ht 5' 3" (1 6 m)   Wt 47 9 kg (105 lb 9 6 oz)   LMP 2019   BMI 18 71 kg/m²          Physical Exam   Constitutional: She is oriented to person, place, and time  She appears well-developed and well-nourished  Eyes: EOM are normal    Neck: Normal range of motion  Neck supple  No thyromegaly present  Cardiovascular: Normal rate, regular rhythm and normal heart sounds  Pulmonary/Chest: Effort normal and breath sounds normal  No stridor  No respiratory distress  She has no wheezes  She has no rales  She exhibits no tenderness  Right breast exhibits no inverted nipple, no mass, no nipple discharge, no skin change and no tenderness  Left breast exhibits no inverted nipple, no mass, no nipple discharge, no skin change and no tenderness  No breast swelling, tenderness, discharge or bleeding  Breasts are symmetrical    Abdominal: Soft   Bowel sounds are normal  She exhibits no distension and no mass  There is no tenderness  There is no rebound and no guarding  No hernia  Hernia confirmed negative in the right inguinal area and confirmed negative in the left inguinal area  Genitourinary: Rectum normal and uterus normal  No labial fusion  There is no rash, tenderness, lesion or injury on the right labia  There is no rash, tenderness, lesion or injury on the left labia  Uterus is not deviated, not enlarged, not fixed and not tender  Cervix exhibits no motion tenderness, no discharge and no friability  Right adnexum displays no mass, no tenderness and no fullness  Left adnexum displays no mass, no tenderness and no fullness  No erythema, tenderness or bleeding in the vagina  No foreign body in the vagina  No signs of injury around the vagina  No vaginal discharge found  Genitourinary Comments: The vagina is clean the uterus is well-supported there is no evidence of discharge  A Pap smear was performed  Musculoskeletal: Normal range of motion  She exhibits no edema or deformity  Lymphadenopathy: No inguinal adenopathy noted on the right or left side  Neurological: She is alert and oriented to person, place, and time  Skin: Skin is warm and dry  No rash noted  No erythema  No pallor  Psychiatric: She has a normal mood and affect   Her behavior is normal

## 2019-12-11 NOTE — PATIENT INSTRUCTIONS
The patient was informed of a stable gyn examination  A Pap smear was performed this no problem depression anxiety  She is happy with her weight  Not sexually active return my office in 1 year

## 2019-12-13 LAB
HPV HR 12 DNA CVX QL NAA+PROBE: POSITIVE
HPV16 DNA CVX QL NAA+PROBE: NEGATIVE
HPV18 DNA CVX QL NAA+PROBE: NEGATIVE

## 2019-12-17 LAB
LAB AP GYN PRIMARY INTERPRETATION: ABNORMAL
LAB AP LMP: ABNORMAL
Lab: ABNORMAL
PATH INTERP SPEC-IMP: ABNORMAL

## 2021-04-01 DIAGNOSIS — Z23 ENCOUNTER FOR IMMUNIZATION: ICD-10-CM

## 2021-10-27 ENCOUNTER — ANNUAL EXAM (OUTPATIENT)
Dept: OBGYN CLINIC | Facility: CLINIC | Age: 36
End: 2021-10-27
Payer: COMMERCIAL

## 2021-10-27 VITALS
WEIGHT: 108.2 LBS | BODY MASS INDEX: 19.17 KG/M2 | SYSTOLIC BLOOD PRESSURE: 104 MMHG | HEIGHT: 63 IN | DIASTOLIC BLOOD PRESSURE: 74 MMHG

## 2021-10-27 DIAGNOSIS — Z01.419 WOMEN'S ANNUAL ROUTINE GYNECOLOGICAL EXAMINATION: Primary | ICD-10-CM

## 2021-10-27 PROCEDURE — G0145 SCR C/V CYTO,THINLAYER,RESCR: HCPCS | Performed by: OBSTETRICS & GYNECOLOGY

## 2021-10-27 PROCEDURE — S0612 ANNUAL GYNECOLOGICAL EXAMINA: HCPCS | Performed by: OBSTETRICS & GYNECOLOGY

## 2021-10-27 PROCEDURE — G0476 HPV COMBO ASSAY CA SCREEN: HCPCS | Performed by: OBSTETRICS & GYNECOLOGY

## 2021-10-27 RX ORDER — LEVOTHYROXINE SODIUM 0.03 MG/1
25 TABLET ORAL DAILY
COMMUNITY
Start: 2021-10-12

## 2021-10-29 LAB
HPV HR 12 DNA CVX QL NAA+PROBE: NEGATIVE
HPV16 DNA CVX QL NAA+PROBE: NEGATIVE
HPV18 DNA CVX QL NAA+PROBE: NEGATIVE

## 2021-11-03 LAB
LAB AP GYN PRIMARY INTERPRETATION: NORMAL
LAB AP LMP: NORMAL
Lab: NORMAL

## 2023-02-08 ENCOUNTER — ANNUAL EXAM (OUTPATIENT)
Dept: OBGYN CLINIC | Facility: CLINIC | Age: 38
End: 2023-02-08

## 2023-02-08 VITALS
SYSTOLIC BLOOD PRESSURE: 96 MMHG | HEIGHT: 63 IN | DIASTOLIC BLOOD PRESSURE: 66 MMHG | WEIGHT: 108.8 LBS | BODY MASS INDEX: 19.28 KG/M2

## 2023-02-08 DIAGNOSIS — Z01.419 WOMEN'S ANNUAL ROUTINE GYNECOLOGICAL EXAMINATION: Primary | ICD-10-CM

## 2023-02-08 NOTE — PROGRESS NOTES
Assessment/Plan:    The patient was informed of a stable GYN examination  Not sexually active content with her weight feels safe at home  She is a dentist on a regular basis  Denies any problem depression patient anxiety under control at the present time  No new major family illnesses to report  Return to my office in 1 year unless new issues occur  Subjective:      Patient ID: Klaudia De Dios is a 44 y o  female  HPI    This is a 58-year-old white female, she is a  1 para 1 with 1 vaginal delivery 6 years ago  She is now   Not sexually active  Her menstrual cycles are regular and predictable  She sees a dentist on a regular basis  She feels safe at home  She lives next door to her parents  She is content with her weight she sees a dentist on a regular basis  She does admit to slight anxiety but under control  Currently not dating  There are no new major family illnesses to report  Patient does admit to slight stress urinary continence with coughing spells  But otherwise she is doing well  She will start getting mammograms after her 42th birthday  There are no new major family illnesses to report  The following portions of the patient's history were reviewed and updated as appropriate: allergies, current medications, past family history, past medical history, past social history, past surgical history and problem list     Review of Systems   All other systems reviewed and are negative  Objective:      BP 96/66   Ht 5' 3" (1 6 m)   Wt 49 4 kg (108 lb 12 8 oz)   LMP 01/15/2023 (Exact Date)   BMI 19 27 kg/m²           Physical Exam  Vitals reviewed  Exam conducted with a chaperone present  Constitutional:       Appearance: Normal appearance  She is normal weight  HENT:      Head: Normocephalic and atraumatic        Nose: Nose normal       Mouth/Throat:      Mouth: Mucous membranes are moist    Eyes:      Pupils: Pupils are equal, round, and reactive to light  Cardiovascular:      Rate and Rhythm: Normal rate and regular rhythm  Pulses: Normal pulses  Heart sounds: Normal heart sounds  Pulmonary:      Effort: Pulmonary effort is normal       Breath sounds: Normal breath sounds  Chest:   Breasts:     Breasts are symmetrical       Right: Normal  No swelling, bleeding, inverted nipple, mass, nipple discharge, skin change or tenderness  Left: No swelling, bleeding, inverted nipple, mass, nipple discharge, skin change or tenderness  Abdominal:      General: Abdomen is flat  Bowel sounds are normal  There is no distension  Palpations: Abdomen is soft  There is no hepatomegaly, splenomegaly or mass  Tenderness: There is no abdominal tenderness  There is no rebound  Hernia: No hernia is present  There is no hernia in the left inguinal area or right inguinal area  Genitourinary:     General: Normal vulva  Pubic Area: No rash or pubic lice  Labia:         Right: No rash, tenderness, lesion or injury  Left: No rash, tenderness, lesion or injury  Urethra: No prolapse, urethral pain, urethral swelling or urethral lesion  Vagina: Normal  No signs of injury and foreign body  No vaginal discharge, erythema, tenderness, bleeding, lesions or prolapsed vaginal walls  Cervix: Normal       Uterus: Normal  Not deviated, not enlarged, not fixed, not tender and no uterine prolapse  Adnexa: Right adnexa normal         Right: No mass or tenderness  Left: No mass, tenderness or fullness  Rectum: Normal       Comments: External genitalia normal limits the vagina is clean the cervix is parous I Pap smear was performed  Uterus is small mobile nontender the adnexa is clear  There is no evidence of prolapse  There is no evidence of infection  Urethra and bladder normal working relationship  Musculoskeletal:         General: Normal range of motion        Cervical back: Normal range of motion and neck supple  Lymphadenopathy:      Upper Body:      Right upper body: No supraclavicular or axillary adenopathy  Left upper body: No supraclavicular or axillary adenopathy  Lower Body: No right inguinal adenopathy  No left inguinal adenopathy  Skin:     General: Skin is warm  Neurological:      General: No focal deficit present  Mental Status: She is alert and oriented to person, place, and time  Psychiatric:         Mood and Affect: Mood normal          Behavior: Behavior normal          Thought Content:  Thought content normal

## 2023-02-08 NOTE — PATIENT INSTRUCTIONS
Patient was informed of a stable GYN examination  A Pap smear was performed  She is content with her weight  She feels safe at home  Currently not dating  She will return my office in 1 year unless new issues occur

## 2023-02-15 LAB
LAB AP GYN PRIMARY INTERPRETATION: NORMAL
LAB AP LMP: NORMAL
Lab: NORMAL

## 2023-05-09 ENCOUNTER — TELEPHONE (OUTPATIENT)
Dept: OBGYN CLINIC | Facility: CLINIC | Age: 38
End: 2023-05-09

## 2023-05-09 NOTE — TELEPHONE ENCOUNTER
Patient seen recently for annual gyn exam   She is interested in some form of birth control, but does not want the pill  Does she need to schedule apt to discuss options with you since seen in February?

## 2023-05-16 ENCOUNTER — TELEPHONE (OUTPATIENT)
Dept: OBGYN CLINIC | Facility: CLINIC | Age: 38
End: 2023-05-16

## 2023-05-16 NOTE — TELEPHONE ENCOUNTER
Pt is calling because she will like to get on birth control  She states she does not want the pills  She did call about this before and she is aware you are not in the office until the 22nd  Pt will like to speak to the nurse about some questions she has about birth control  Pt aware the nurse is not in the office today and will return tomorrow        [phone]

## 2023-05-17 NOTE — TELEPHONE ENCOUNTER
Pt is now sexually active & presently using condoms for birth control  Prev discussed Lala Purdon IUD & is now interested in this option  Also discussed progesterone ocp, Depo Provera  LMP 5/11/2023  Informed pt will f/u with RAOW when back in office on 5/22/2023 & f/u if recom Lala Purdon & if so, will schedule appt for IUD insertion after next menses

## 2023-05-22 NOTE — TELEPHONE ENCOUNTER
I spoke to the patient this morning  We will make arranges for IUD insertion  She wants a 5-year IUD  We will do this the week of her menstrual cycle  All questions were answered

## 2023-10-24 ENCOUNTER — OFFICE VISIT (OUTPATIENT)
Dept: OBGYN CLINIC | Facility: CLINIC | Age: 38
End: 2023-10-24
Payer: COMMERCIAL

## 2023-10-24 VITALS
SYSTOLIC BLOOD PRESSURE: 102 MMHG | DIASTOLIC BLOOD PRESSURE: 60 MMHG | WEIGHT: 105.4 LBS | BODY MASS INDEX: 18.68 KG/M2 | HEIGHT: 63 IN

## 2023-10-24 DIAGNOSIS — Z30.09 BIRTH CONTROL COUNSELING: ICD-10-CM

## 2023-10-24 DIAGNOSIS — Z11.3 SCREENING FOR STD (SEXUALLY TRANSMITTED DISEASE): ICD-10-CM

## 2023-10-24 DIAGNOSIS — N90.89 VULVAR IRRITATION: Primary | ICD-10-CM

## 2023-10-24 PROCEDURE — 99214 OFFICE O/P EST MOD 30 MIN: CPT | Performed by: OBSTETRICS & GYNECOLOGY

## 2023-10-24 NOTE — PROGRESS NOTES
Assessment/Plan:  Cultures obtained for GC, chlamydia, trichomonas. Difficult to assess given day 2 of menstrual cycle. Reviewed all precautions. She is now inquiring about IUD as form of contraception. Discussed the risks and benefits including breakthrough bleeding. All questions answered. She would like to proceed with Mirena IUD, office will contact her regarding preauthorization. Discussed that it would be inserted week of menses, NSAIDs prior to office visit. All questions answered. No problem-specific Assessment & Plan notes found for this encounter. Diagnoses and all orders for this visit:    Vulvar irritation    Screening for STD (sexually transmitted disease)  -     Ct, Ng, Trich vag by RYAN    Birth control counseling          Subjective:      Patient ID: Benny Muniz is a 44 y.o. female. HPI    This is a very pleasant 14-year-old female P1 ( x1, age 8) presents complaining of external vaginal irritation over the last 48 hours. She started her menstrual cycle (as scheduled) yesterday. She was sexually active 3 nights prior, new partner. Method of contraception, withdrawal.  This was her first sexual encounter with current partner. She states that she had a prior encounter 2023. She is slightly concerned regarding possible sexual transmission. She denies any vaginal discharge. No difficulty voiding. Bowel movements are normal.  She does exercise regularly,running, discussed clothing/undergarments. The following portions of the patient's history were reviewed and updated as appropriate: allergies, current medications, past family history, past medical history, past social history, past surgical history, and problem list.    Review of Systems   Constitutional:  Negative for fatigue, fever and unexpected weight change. Respiratory:  Negative for cough, chest tightness, shortness of breath and wheezing. Cardiovascular: Negative.   Negative for chest pain and palpitations. Gastrointestinal: Negative. Negative for abdominal distention, abdominal pain, blood in stool, constipation, diarrhea, nausea and vomiting. Genitourinary: Negative. Negative for difficulty urinating, dyspareunia, dysuria, flank pain, frequency, genital sores, hematuria, pelvic pain, urgency, vaginal bleeding, vaginal discharge and vaginal pain. Skin:  Negative for rash. Objective:      /60   Ht 5' 3" (1.6 m)   Wt 47.8 kg (105 lb 6.4 oz)   LMP 10/23/2023 (Exact Date)   BMI 18.67 kg/m²          Physical Exam  Constitutional:       Appearance: Normal appearance. Genitourinary:     Labia:         Right: No rash, tenderness or lesion. Left: No rash, tenderness or lesion. Vagina: No signs of injury. No vaginal discharge or tenderness. Cervix: No cervical motion tenderness, discharge, friability, lesion, erythema or cervical bleeding. Uterus: Not enlarged and not tender. Neurological:      Mental Status: She is alert. Patient is menstruating.

## 2023-10-25 ENCOUNTER — TELEPHONE (OUTPATIENT)
Dept: OBGYN CLINIC | Facility: CLINIC | Age: 38
End: 2023-10-25

## 2023-10-25 LAB
C TRACH RRNA SPEC QL NAA+PROBE: NOT DETECTED
N GONORRHOEA RRNA SPEC QL NAA+PROBE: NOT DETECTED
T VAGINALIS RRNA SPEC QL NAA+PROBE: NOT DETECTED

## 2023-10-25 NOTE — TELEPHONE ENCOUNTER
Patient informed of negative vaginal culture results from 10/24/2023. She is interested in IUD (Mirena) & will schedule with either provider.   LMP 10/23/2023

## 2023-10-25 NOTE — TELEPHONE ENCOUNTER
----- Message from Gonzalez Abdullahi DO sent at 10/25/2023 12:13 PM EDT -----  Please call the patient cultures are negative.   I did send an email to [facility] regarding patient requesting IUD (this is JW's patient, she can schedule a visit with him or me)

## 2023-10-26 ENCOUNTER — TELEPHONE (OUTPATIENT)
Dept: OBGYN CLINIC | Facility: CLINIC | Age: 38
End: 2023-10-26

## 2023-10-26 NOTE — TELEPHONE ENCOUNTER
Received phone call from [facility] spoke with Carmine Ro advised pt needs to contact her employer regarding the status of her account she was not able to verify benefits or preaut until pt calls. Phone call to patient advised of the above and pt stated she will call our office back.

## 2023-10-26 NOTE — TELEPHONE ENCOUNTER
Phone call to [address] [place] [address]  department regarding IUD Mirena left message on voicemail for a return call was on wait for more than 30 minutes.

## 2023-10-30 NOTE — TELEPHONE ENCOUNTER
Ahmet Kent from [employer] member benefits called with patient on the other line. After reviewing her account stated issue is fixed and you can call for authorization. She was unable to verify benefits or authorization.  Must call the preauthorzation number # [phone]

## 2023-11-13 ENCOUNTER — TELEPHONE (OUTPATIENT)
Dept: OBGYN CLINIC | Facility: CLINIC | Age: 38
End: 2023-11-13

## 2023-11-13 NOTE — TELEPHONE ENCOUNTER
Called [employer] benefits reference called # [id number]  advised pt needs to contact her employer regarding the status of her account she was not able to verify benefits or preaut until pt calls. There is nothing on file regarding pt.

## 2023-12-05 ENCOUNTER — TELEPHONE (OUTPATIENT)
Dept: OBGYN CLINIC | Facility: CLINIC | Age: 38
End: 2023-12-05

## 2023-12-05 NOTE — TELEPHONE ENCOUNTER
IUD for Mirena covered by insurance at 100% no authorization neeeded spoke with Maira Wong, call reference number is [id number] on 12/01/2023      Awaiting from pharmacy to receive Mirena.

## 2024-03-10 ENCOUNTER — OFFICE VISIT (OUTPATIENT)
Dept: URGENT CARE | Facility: CLINIC | Age: 39
End: 2024-03-10
Payer: COMMERCIAL

## 2024-03-10 VITALS
RESPIRATION RATE: 18 BRPM | HEART RATE: 70 BPM | TEMPERATURE: 98.7 F | OXYGEN SATURATION: 99 % | SYSTOLIC BLOOD PRESSURE: 115 MMHG | DIASTOLIC BLOOD PRESSURE: 68 MMHG

## 2024-03-10 DIAGNOSIS — Z11.8 SCREENING FOR HEAD LICE: Primary | ICD-10-CM

## 2024-03-10 PROCEDURE — 99213 OFFICE O/P EST LOW 20 MIN: CPT | Performed by: PHYSICIAN ASSISTANT

## 2024-03-10 NOTE — PROGRESS NOTES
[facility]        NAME: Chelle Pugh is a 38 y.o. female  : 1985    MRN: [id number]  DATE: March 10, 2024  TIME: 1:20 PM    /68   Pulse 70   Temp 98.7 °F (37.1 °C)   Resp 18   SpO2 99%     Assessment and Plan   Screening for head lice [Z11.8]  1. Screening for head lice              Patient Instructions       Follow up with PCP in 3-5 days.  Proceed to  ER if symptoms worsen.    Chief Complaint   No chief complaint on file.        History of Present Illness       Pt with son with lice,  pt would like to be evaluatied for lice,   pt used shampoo yesterday         Review of Systems   Review of Systems   Constitutional: Negative.    HENT: Negative.     Eyes: Negative.    Respiratory: Negative.     Cardiovascular: Negative.    Gastrointestinal: Negative.    Endocrine: Negative.    Genitourinary: Negative.    Musculoskeletal: Negative.    Skin: Negative.    Allergic/Immunologic: Negative.    Neurological: Negative.    Hematological: Negative.    Psychiatric/Behavioral: Negative.     All other systems reviewed and are negative.        Current Medications       Current Outpatient Medications:     Cholecalciferol (VITAMIN D3) 1000 units CAPS, Take by mouth daily, Disp: , Rfl:     Collagen 500 MG CAPS, Take 500 mg by mouth daily, Disp: , Rfl:     Cyanocobalamin (B-12 PO), Take by mouth daily (Patient not taking: Reported on 10/24/2023), Disp: , Rfl:     Docosahexaenoic Acid (DHA PO), Take 1 tablet by mouth daily.   (Patient not taking: Reported on 10/27/2021), Disp: , Rfl:     ferrous sulfate 325 (65 Fe) mg tablet, Take 325 mg by mouth, Disp: , Rfl:     levothyroxine 25 mcg tablet, Take 75 mcg by mouth daily, Disp: , Rfl:     Multiple Vitamin (MULTIVITAMIN) tablet, Take 1 tablet by mouth daily, Disp: , Rfl:     Prenatal Vit-Fe Fumarate-FA (PRENATAL PO), Take 1 tablet by mouth daily.   (Patient not taking: Reported on 10/27/2021), Disp: , Rfl:     Probiotic Product (PROBIOTIC DAILY PO),  Take 1 tablet by mouth daily., Disp: , Rfl:     Pyridoxine HCl (VITAMIN B-6 PO), Take 1 tablet by mouth daily.   (Patient not taking: Reported on 10/27/2021), Disp: , Rfl:     Current Allergies     Allergies as of 03/10/2024 - Reviewed 03/10/2024   Allergen Reaction Noted    Amoxicillin-pot clavulanate Diarrhea 10/04/2022    Gluten meal - food allergy  09/12/2016    Lactose - food allergy  01/14/2019    Tilactase  09/12/2016    Clindamycin Rash 09/12/2016    Wild cherry syrup - food allergy Rash 09/28/2022            The following portions of the patient's history were reviewed and updated as appropriate: allergies, current medications, past family history, past medical history, past social history, past surgical history and problem list.     Past Medical History:   Diagnosis Date    Abnormal Pap smear of cervix     HPV colpo 2003, wnl since per pt     Anemia     Complication of anesthesia     Nausea at times    Female infertility     Herpes     pt had one lesion years ago 2010 and reports md said was herpes     ITP (idiopathic thrombocytopenic purpura)     resolved 1994    Migraine     Varicella     had as child        Past Surgical History:   Procedure Laterality Date    COLPOSCOPY      CYST REMOVAL      ovarian cyst removed 2000    NM EXCISION MAL LESION TRUNK/ARM/LEG 0.5 CM/< Left 9/26/2017    Procedure: SHIN WIDE EXCISION MELANOMA SCAR;  Surgeon: Gerald Breen MD;  Location: [facility];  Service: Surgical Oncology    WISDOM TOOTH EXTRACTION         Family History   Problem Relation Age of Onset    Heart disease Maternal Grandmother     Heart disease Maternal Grandfather     Cancer Maternal Grandfather     Heart disease Paternal Grandfather     Mental retardation Other     Cancer Other     Hypothyroidism Mother     Diabetes Father          Medications have been verified.        Objective   /68   Pulse 70   Temp 98.7 °F (37.1 °C)   Resp 18   SpO2 99%        Physical Exam     Physical Exam  Vitals and  nursing note reviewed.   Constitutional:       Appearance: Normal appearance. She is normal weight.   HENT:      Head: Normocephalic and atraumatic.   Cardiovascular:      Rate and Rhythm: Normal rate and regular rhythm.      Pulses: Normal pulses.      Heart sounds: Normal heart sounds.   Pulmonary:      Effort: Pulmonary effort is normal.      Breath sounds: Normal breath sounds.   Musculoskeletal:      Cervical back: Normal range of motion and neck supple.   Skin:     Comments: No lice or nits seen , extensive evaluation done, pt with long hair    Neurological:      Mental Status: She is alert and oriented to person, place, and time.

## 2024-04-03 ENCOUNTER — OFFICE VISIT (OUTPATIENT)
Dept: OBGYN CLINIC | Facility: CLINIC | Age: 39
End: 2024-04-03
Payer: COMMERCIAL

## 2024-04-03 VITALS — WEIGHT: 105 LBS | DIASTOLIC BLOOD PRESSURE: 68 MMHG | BODY MASS INDEX: 18.6 KG/M2 | SYSTOLIC BLOOD PRESSURE: 122 MMHG

## 2024-04-03 DIAGNOSIS — L30.9 DERMATITIS: Primary | ICD-10-CM

## 2024-04-03 DIAGNOSIS — N89.8 VAGINAL DISCHARGE: ICD-10-CM

## 2024-04-03 DIAGNOSIS — Z12.4 SCREENING FOR CERVICAL CANCER: ICD-10-CM

## 2024-04-03 DIAGNOSIS — Z11.3 SCREENING FOR STD (SEXUALLY TRANSMITTED DISEASE): ICD-10-CM

## 2024-04-03 DIAGNOSIS — N89.8 VAGINAL DRYNESS: ICD-10-CM

## 2024-04-03 PROCEDURE — 87591 N.GONORRHOEAE DNA AMP PROB: CPT | Performed by: OBSTETRICS & GYNECOLOGY

## 2024-04-03 PROCEDURE — 81514 NFCT DS BV&VAGINITIS DNA ALG: CPT | Performed by: OBSTETRICS & GYNECOLOGY

## 2024-04-03 PROCEDURE — G0145 SCR C/V CYTO,THINLAYER,RESCR: HCPCS | Performed by: OBSTETRICS & GYNECOLOGY

## 2024-04-03 PROCEDURE — 87491 CHLMYD TRACH DNA AMP PROBE: CPT | Performed by: OBSTETRICS & GYNECOLOGY

## 2024-04-03 PROCEDURE — G0476 HPV COMBO ASSAY CA SCREEN: HCPCS | Performed by: OBSTETRICS & GYNECOLOGY

## 2024-04-03 PROCEDURE — 99214 OFFICE O/P EST MOD 30 MIN: CPT | Performed by: OBSTETRICS & GYNECOLOGY

## 2024-04-03 RX ORDER — TRIAMCINOLONE ACETONIDE 5 MG/G
CREAM TOPICAL 3 TIMES DAILY
Qty: 15 G | Refills: 0 | Status: SHIPPED | OUTPATIENT
Start: 2024-04-03

## 2024-04-03 NOTE — PROGRESS NOTES
This is a 48-year-old female well-known to me.  She has not been sexually active for almost 7 years.  She is now been engaged in new relationship things are going well.  She is concerned about a possible discharge and itching on the outside.    Inspection of the external genitalia is negative.  The vagina is clean.  Questionable discharge a culture was done.  We also did a Pap smear and will screen for GC and chlamydia.  I think all of these will be negative.    The patient is also thing about method of contraception.  She is not interested in the IUD.  Currently she is using withdrawal.  A brochure was given for Kyleena.  She likes the method she will make arrangements for insertion after her next menstrual cycle.  A brochure was given.    Impression I believe the vaginal discharge will be benign and help examining suspicious.  Cultures were taken.  Will also make arrangements for IUD insertion probably next week this is her plan.  She will be informed of the results when he returns.  Will also give some Kenalog cream for maybe a skin abrasion on the outside of the vagina.

## 2024-04-03 NOTE — PATIENT INSTRUCTIONS
Patient was informed of negative findings.  Will await results of molecular panel.  There are some area of skin irritation on the lower external genitalia we will treat that with Kenalog cream twice a day for the next 6 to 10 days she will keep informed of her progress.  She will make arranges for IUD insertion probably next week.

## 2024-04-04 ENCOUNTER — NURSE TRIAGE (OUTPATIENT)
Age: 39
End: 2024-04-04

## 2024-04-04 DIAGNOSIS — B96.89 BV (BACTERIAL VAGINOSIS): Primary | ICD-10-CM

## 2024-04-04 DIAGNOSIS — B37.31 VAGINAL YEAST INFECTION: ICD-10-CM

## 2024-04-04 DIAGNOSIS — N76.0 BV (BACTERIAL VAGINOSIS): Primary | ICD-10-CM

## 2024-04-04 LAB
C GLABRATA DNA VAG QL NAA+PROBE: NEGATIVE
C KRUSEI DNA VAG QL NAA+PROBE: NEGATIVE
CANDIDA SP 6 PNL VAG NAA+PROBE: POSITIVE
HPV HR 12 DNA CVX QL NAA+PROBE: NEGATIVE
HPV16 DNA CVX QL NAA+PROBE: NEGATIVE
HPV18 DNA CVX QL NAA+PROBE: NEGATIVE
T VAGINALIS DNA VAG QL NAA+PROBE: NEGATIVE
VAGINOSIS/ITIS DNA PNL VAG PROBE+SIG AMP: POSITIVE

## 2024-04-04 RX ORDER — METRONIDAZOLE 500 MG/1
TABLET ORAL
Qty: 14 TABLET | Refills: 1 | Status: SHIPPED | OUTPATIENT
Start: 2024-04-06 | End: 2024-04-12

## 2024-04-04 RX ORDER — FLUCONAZOLE 200 MG/1
TABLET ORAL
Qty: 2 TABLET | Refills: 2 | Status: SHIPPED | OUTPATIENT
Start: 2024-04-04 | End: 2024-04-05

## 2024-04-04 NOTE — TELEPHONE ENCOUNTER
"Patient called asking to speak with Dr. Webb about additional questions. Patient had questions regarding medications prescribed by provider for yeast and BV. Patient had questions regarding when to resume intercourse while on antibiotics, side effects etc. Reviewed with patient should use condoms when being sexually active until antibiotic treatment completed and monitor for symptoms once treatment completed as well. Reviewed use of cotton underwear, unscented soaps, and no feminine hygiene products. Patient asked about side effects of medication. Reviewed Diflucan and Flagyl. Patient aware to complete full treatment and to avoid alcohol. Answered all questions. Patient verbalized understanding. Patient states do not have to ask Dr. Webb to call back.    Reason for Disposition   Caller has medicine question only, adult not sick, and triager answers question    Answer Assessment - Initial Assessment Questions  1. NAME of MEDICATION: \"What medicine are you calling about?\"      Diflucan and Flagyl  2. QUESTION: \"What is your question?\" (e.g., medication refill, side effect)      Side effects  3. PRESCRIBING HCP: \"Who prescribed it?\" Reason: if prescribed by specialist, call should be referred to that group.  OBGYN    Protocols used: Medication Question Call-ADULT-[place]    "

## 2024-04-05 LAB
C TRACH DNA SPEC QL NAA+PROBE: NEGATIVE
N GONORRHOEA DNA SPEC QL NAA+PROBE: NEGATIVE

## 2024-04-11 LAB
LAB AP GYN PRIMARY INTERPRETATION: NORMAL
Lab: NORMAL

## 2024-10-02 ENCOUNTER — TELEPHONE (OUTPATIENT)
Dept: DERMATOLOGY | Facility: CLINIC | Age: 39
End: 2024-10-02

## 2024-10-02 NOTE — TELEPHONE ENCOUNTER
Called to advise pt her appt on 3/25 with Dr. Egan needs to be r/s, as provider will be out of the office. Left a message that appt has been r/s for 1/14 @ 3:00 with Dr. Egan in [facility] office. Advised pt to call the office to confirm new appt, or to r/s if needed.

## 2024-12-16 ENCOUNTER — TELEPHONE (OUTPATIENT)
Age: 39
End: 2024-12-16

## 2024-12-16 NOTE — TELEPHONE ENCOUNTER
Pt called in stating she recently found out she was pregnant and wondering about safe medications. States her PCP did send in a prescription for a prenatal vitamin. States she used to take melatonin or sleep gummies, wondering if that would be okay going forward or what she can use for sleep. Advised melatonin is okay as well as 25 mg benadryl/12.5 mg unisom. Advised unisom can also be beneficial if she were having nausea/vomiting. She verbalized understanding and is thankful.

## 2024-12-16 NOTE — TELEPHONE ENCOUNTER
Patient would like an HCG test and is aware that she needs to find a different facility for OB care-she just wants to check how far along she is at present.

## 2025-01-02 NOTE — PROGRESS NOTES
"S: 39 y.o.  who presents for viability scan with LMP of 24. +UPT on Dec 14. She is 7 weeks and 6 days by her LMP. Her menses are reg. She reports nausea (tolerable). She denies cramping or vaginal bleeding. This is  a planned and welcomed pregnancy.    Past Medical History:   Diagnosis Date    Abnormal Pap smear of cervix     HPV colpo , wnl since per pt     Anemia     Complication of anesthesia     Nausea at times    Female infertility     Herpes     pt had one lesion years ago  and reports md said was herpes     ITP (idiopathic thrombocytopenic purpura)     resolved     Migraine     Varicella     had as child        OB History    Para Term  AB Living   2 1 1 0 0 1   SAB IAB Ectopic Multiple Live Births   0 0 0 0 1      # Outcome Date GA Lbr Bossman/2nd Weight Sex Type Anes PTL Lv   2 Current            1 Term 10/19/16 39w5d 22:55 / 00:40 3485 g (7 lb 10.9 oz) M Vag-Vacuum EPI N ADA      Obstetric Comments   IVF , Pt egg and  sperm embyro genetic testing prior to implant         O:  Vitals:    25 1444   BP: 94/60   BP Location: Left arm   Patient Position: Sitting   Cuff Size: Standard   Weight: 51.2 kg (112 lb 12.8 oz)   Height: 5' 3\" (1.6 m)       TVUS: viable, denis  IUP, measuring 1.38 cm, correlating with 7w5d. NEFTALY of 25, based off today's US. .               A/P:  1. Viable pregnancy on TVUS  2. MFM referral placed.  3. RTC in 2 week for nurse intake visit    Problem List Items Addressed This Visit    None  Visit Diagnoses         Positive pregnancy test    -  Primary    Relevant Orders    AMB US OB < 14 weeks single or first gestation level 1      7 weeks gestation of pregnancy        Relevant Orders    Ambulatory Referral to Maternal Fetal Medicine            "

## 2025-01-03 ENCOUNTER — ULTRASOUND (OUTPATIENT)
Dept: OBGYN CLINIC | Facility: CLINIC | Age: 40
End: 2025-01-03
Payer: COMMERCIAL

## 2025-01-03 VITALS
SYSTOLIC BLOOD PRESSURE: 94 MMHG | HEIGHT: 63 IN | BODY MASS INDEX: 19.99 KG/M2 | DIASTOLIC BLOOD PRESSURE: 60 MMHG | WEIGHT: 112.8 LBS

## 2025-01-03 DIAGNOSIS — Z32.01 POSITIVE PREGNANCY TEST: Primary | ICD-10-CM

## 2025-01-03 DIAGNOSIS — Z3A.01 7 WEEKS GESTATION OF PREGNANCY: ICD-10-CM

## 2025-01-03 PROCEDURE — 99213 OFFICE O/P EST LOW 20 MIN: CPT

## 2025-01-03 PROCEDURE — 76817 TRANSVAGINAL US OBSTETRIC: CPT

## 2025-01-06 ENCOUNTER — NURSE TRIAGE (OUTPATIENT)
Age: 40
End: 2025-01-06

## 2025-01-06 NOTE — TELEPHONE ENCOUNTER
Regarding: Prenatal vitamin doses & recommendations  ----- Message from Nicolle CRYSTAL sent at 1/6/2025 10:31 AM EST -----  Patient called regarding prenatal vitamins & recommended doses. Pt currently takes prenatal vitamin w/ folic & DHA, D3; inquired if they should take choline (not in prenatal), how much folic acid should be taken (i.e., is prenatal sufficient or should take additional?) & whether pt should take probiotics?   Attempted warm transfer

## 2025-01-06 NOTE — TELEPHONE ENCOUNTER
Reason for Disposition  • Health information question, no triage required and triager able to answer question    Answer Assessment - Initial Assessment Questions  Discussed OTC prenatal vitamins.  Choline can be added if not meeting recommendaitons with diet including beef, eggs, salmon, legumes, beans, crucifereous vegetables, yogurt as example.    Chelle's does not feel her diet includes much choline.     Reviewed different multi vitamin containing choline or can add 450mg per day.     Encouraged further discussion with OB at next visit.    Protocols used: Information Only Call - No Triage-Adult-[place]

## 2025-01-08 ENCOUNTER — TELEPHONE (OUTPATIENT)
Age: 40
End: 2025-01-08

## 2025-01-08 NOTE — TELEPHONE ENCOUNTER
8w4d OB patient asking if ok to get hair dyed tomorrow. Advised she should be in a well-ventilated room, otherwise no contraindication to getting hair dyed in pregnancy. Patient verbalized understanding and appreciative.

## 2025-01-19 ENCOUNTER — NURSE TRIAGE (OUTPATIENT)
Dept: OTHER | Facility: OTHER | Age: 40
End: 2025-01-19

## 2025-01-19 NOTE — TELEPHONE ENCOUNTER
"Reason for Disposition  • Cough    Answer Assessment - Initial Assessment Questions  1. ONSET: \"When did the cough begin?\"             Yesterday     2. SEVERITY: \"How bad is the cough today?\"             Frequent coughing     3. SPUTUM: \"Describe the color of your sputum\" (e.g., none, dry cough; clear, white, yellow, green)             Hasn't coughed anything up       But feels like a phlegmy cough     4. HEMOPTYSIS: \"Are you coughing up any blood?\" If Yes, ask: \"How much?\" (e.g., flecks, streaks, tablespoons, etc.)             Denies     5. DIFFICULTY BREATHING: \"Are you having difficulty breathing?\" If Yes, ask: \"How bad is it?\" (e.g., mild, moderate, severe)              Denies     6. FEVER: \"Do you have a fever?\" If Yes, ask: \"What is your temperature, how was it measured, and when did it start?\"              Denies     7. . OTHER SYMPTOMS: \"Do you have any other symptoms?\" (e.g., runny nose, wheezing, chest pain)                Denies     8. PREGNANCY: \"Is there any chance you are pregnant?\" \"When was your last menstrual period?\"                10w1d    9. TRAVEL: \"Have you traveled out of the country in the last month?\" (e.g., travel history, exposures)               Denies but just traveled to [place]    Protocols used: Cough - Acute Productive-Adult-AH    "

## 2025-01-19 NOTE — TELEPHONE ENCOUNTER
Patient calling in with concerns due to a frequent cough and unsure what is safe to take as she is 10w1d pregnant. Patient denies any wheezing, shortness of breath, fevers or pregnancy related concerns. Informed the patient that during pregnancy at her gestational age she can safely take Plain Robitussin, cough drops, honey, warm fluids, Tylenol and can also take allergy medications such as plain Allegra, Claritin or Zyrtec. Instructed patient to follow up with her PCP if her symptoms continue to worsen or if they don't start to improve. Patient verbalized understanding. No further assistance needed at this time.

## 2025-01-19 NOTE — TELEPHONE ENCOUNTER
"Reason for Disposition   Caller requesting information about medicine during pregnancy; adult is not ill AND triager answers question    Answer Assessment - Initial Assessment Questions  1. NAME of MEDICINE: \"What medicine(s) are you calling about?\"        Mucinex 600mg and Extra strength tylenol    2. QUESTION: \"What is your question?\" (e.g., double dose of medicine, side effect)        Are these safe to take during pregnancy?    3. PRESCRIBER: \"Who prescribed the medicine?\" Reason: if prescribed by specialist, call should be referred to that group.        Otc    4. SYMPTOMS: \"Do you have any symptoms?\" If Yes, ask: \"What symptoms are you having?\"  \"How bad are the symptoms (e.g., mild, moderate, severe)        Cough, headache    5. PREGNANCY:  \"Is there any chance that you are pregnant?\" \"When was your last menstrual period?\"        [id number]    Protocols used: Medication Question Call-Adult-[facility] message sent to on call provider. Provider stated yes both medications are safe. If symptoms get worse or persist, should see pcp. Informed patient and she verbalized understanding.    "

## 2025-01-19 NOTE — TELEPHONE ENCOUNTER
"Regarding: 10 Weeks Pregnant, Medication Question  ----- Message from Rosa MARTINES sent at 1/19/2025 10:54 AM EST -----  \" I am 10 Weeks Pregnant and would like to know what I can take for a Deep Chest Cough.\"    "

## 2025-01-23 ENCOUNTER — INITIAL PRENATAL (OUTPATIENT)
Dept: OBGYN CLINIC | Facility: CLINIC | Age: 40
End: 2025-01-23

## 2025-01-23 VITALS
SYSTOLIC BLOOD PRESSURE: 118 MMHG | BODY MASS INDEX: 20.27 KG/M2 | DIASTOLIC BLOOD PRESSURE: 60 MMHG | HEIGHT: 63 IN | WEIGHT: 114.4 LBS

## 2025-01-23 DIAGNOSIS — Z34.81 PRENATAL CARE, SUBSEQUENT PREGNANCY, FIRST TRIMESTER: Primary | ICD-10-CM

## 2025-01-23 DIAGNOSIS — Z31.430 ENCOUNTER OF FEMALE FOR TESTING FOR GENETIC DISEASE CARRIER STATUS FOR PROCREATIVE MANAGEMENT: ICD-10-CM

## 2025-01-23 PROBLEM — Z34.91 PRENATAL CARE IN FIRST TRIMESTER: Status: ACTIVE | Noted: 2025-01-23

## 2025-01-23 PROCEDURE — OBC

## 2025-01-23 NOTE — PATIENT INSTRUCTIONS
Congratulations!! Please review our Pregnancy Essential Guide and [facility] L&D Virtual tour from our networks website.     [facility] Pregnancy Essentials Guide  [facility] ([email or web address])     [facility] - Virtual Tour ([employer])

## 2025-01-23 NOTE — PROGRESS NOTES
OB/GYN  PN Visit  Chelle Pugh  [id number]  2025  12:48 PM  MAGALYS Aquino    S: 39 y.o.  11w2d here for PN visit. Her pregnancy is uncomplicated.    She recently had walking pneumonia. She is taking azithromycin.     She denies contractions. She denies leakage of fluid and vaginal bleeding.   She does not have fetal movement.   She denies  vomiting, headache, cramping, edema, and smoking.   Patient feels safe at home.    She reports nausea, tolerable.       O:  Pre- Vitals      Flowsheet Row Most Recent Value   Prenatal Assessment    Fetal Heart Rate 172   Prenatal Vitals    Blood Pressure 98/60   Weight - Scale 52.2 kg (115 lb)   Urine Albumin/Glucose    Dilation/Effacement/Station    Vaginal Drainage    Edema           Wt=52.2 kg (115 lb); Body mass index is 20.37 kg/m².; TWG=4.082 kg (9 lb)    General: Well appearing, no distress.  OB exam completed: fundal height, +FHT.  Urine: -/-     A/P:    Problem List Items Addressed This Visit       Prenatal care in first trimester - Primary    - Continue PNV  - Labor precautions reviewed  - Fetal movement reviewed  - Labs: advised to complete  -Pap: 2024  -Rh: A-  - Genetics: MFM 24  - Ultrasounds: 24  - Tdap: 28 weeks  - Flu Shot: Will offer in season  - Rhogam: require at 28 weeks  - Delivery: TBD  - Contraception: TBD  - Breastfeeding: TBD  - Pediatrician: established  -Delivery Consent & Packet: 30 weeks  -GBS: 36 weeks  - RTO in 4 weeks           Relevant Orders    AMB US OB < 14 weeks single or first gestation level 1 (Completed)       MAGALYS Aquino  2025  12:48 PM

## 2025-01-23 NOTE — ASSESSMENT & PLAN NOTE
- Continue PNV  - Labor precautions reviewed  - Fetal movement reviewed  - Labs: advised to complete  -Pap: 04/2024  -Rh: A-  - Genetics: MFM 01/27/24  - Ultrasounds: 01/27/24  - Tdap: 28 weeks  - Flu Shot: Will offer in season  - Rhogam: require at 28 weeks  - Delivery: TBD  - Contraception: TBD  - Breastfeeding: TBD  - Pediatrician: established  -Delivery Consent & Packet: 30 weeks  -GBS: 36 weeks  - RTO in 4 weeks

## 2025-01-23 NOTE — PROGRESS NOTES
Details that I feel the provider should be aware of:   - hx term VAVD 10/19/16. Pregnancy achieved w/IVF (different FOB) and uncomplicated  - last pap 4/3/24 NILM w/neg HPV  - ITP as child, resolved  per pt  - AMA  - A negative blood type on file  - hx hypothyroidism, current tx 112mcg levothyroxine daily (managed by PCP), no recent TFTs, ordered to complete with prenatal labs  - [facility] appts scheduled: consult w/GC 25 and NT ultrasound 2/3/25    OB INTAKE INTERVIEW  Patient is 39 y.o. who presents for OB intake at 10w5d  She is accompanied by her significant other, David during this encounter  The father of her baby (David) is involved in the pregnancy.      Last Menstrual Period: 24  Ultrasound: Measured 7 weeks 5 days on 1/3/25  Estimated Date of Delivery: 25 by LMP confirmed by 7 week US    Signs/Symptoms of Pregnancy  Current pregnancy symptoms: nausea, no vomiting. Nausea is more sporadic vs persistent/daily. Unisom qHS and vitaminB6 and has been able to manage. Reviewed tips and OTC recommendations for nausea in pregnancy.  Constipation no  Headaches YES - reviewed tylenol + caffeine and when to call office  Cramping/spotting no  PICA cravings no    Diabetes-  BMI 18.78  If patient has 1 or more, please order early 1 hour GTT  History of GDM no  BMI >35 no  History of PCOS or current metformin use (should stop for 7 days prior to 1hr GTT unless pre-existing diabetes)  no  History of LGA/macrosomic infant (4000g/9lbs) no    If patient has 2 or more, please order early 1 hour GTT  BMI>30 no  AMA YES  First degree relative with type 2 diabetes YES  History of chronic HTN, hyperlipidemia, elevated A1C no  High risk race (, , ,  or ) no    Hypertension- if you answer yes to any of the following, please order baseline preeclampsia labs (cbc, comprehensive metabolic panel, urine protein creatinine ratio, uric acid)  History  of of chronic HTN no  History of gestational HTN no  History of preeclampsia, eclampsia, or HELLP syndrome no  History of diabetes no  History of lupus,sjogrens syndrome, kidney disease no    Thyroid- if yes order TSH with reflex T4  History of thyroid disease YES - currently taking 112mcg levothyroxine daily as managed by PCP. No recent TFTs, ordered and reviewed    Bleeding Disorder or Hx of DVT-patient or first degree relative with history of. Order the following if not done previously.   (Factor V, antithrombin III, prothrombin gene mutation, protein C and S Ag, lupus anticoagulant, anticardiolipin, beta-2 glycoprotein)   no    OB/GYN-  History of abnormal pap smear YES s/p colpo  wnl since      Date of last pap smear 4/3/24 NILM w/neg HPV  History of HPV yes   History of Herpes/HSV no  History of other STI (gonorrhea, chlamydia, trich) no  History of prior  YES-VAVD   History of prior  no  History of  delivery prior to 36 weeks 6 days no  History of blood transfusion no  Ok for blood transfusion yes    Substance screening-   History of tobacco use no  Currently using tobacco no  Substance Use Screen Level (N/A, LOW, HIGH) n/a    MRSA Screening-   Does the pt have a hx of MRSA? no    Immunizations:  Influenza vaccine given this season reviewed   Discussed Tdap vaccine yes  Discussed COVID Vaccine yes    Genetic/[facility]-  Do you or your partner have a history of any of the following in yourselves or first degree relatives?  Cystic fibrosis no  Spinal muscular atrophy no  Hemoglobinopathy/Sickle Cell/Thalassemia no  Fragile X Intellectual Disability no    Patient reports completing expanded carrier screening panel w/[facility] prior pregnancy .    Appointment for Nuchal Translucency Ultrasound at [facility] scheduled for 2/3/25, consult w/GC 25      Interview education  [facility] Pregnancy Essentials Book reviewed, discussed and attached to their AVS yes    Nurse/Family Partnership- patient  may qualify no; referral placed no    Prenatal lab work scripts yes  Extra labs ordered:  TFTs, 1hr glucose    Aspirin/Preeclampsia Screen    Risk Level Risk Factor Recommendation   LOW Prior Uncomplicated full-term delivery yes No Aspirin recommendation        MODERATE Nulliparity no Recommend low-dose aspirin if     BMI>30 no 2 or more moderate risk factors    Family History Preeclampsia (mother/sister) no     35yr old or greater YES     Black Race, Concern for SDOH/Low Socioeconomic no     IVF Pregnancy  no     Personal History Risks (low birth weight, prior adverse preg outcome, >10yr preg interval) no         HIGH History of Preeclampsia no Recommend low-dose aspirin if     Multifetal gestation no 1 or more high risk factors    Chronic HTN no     Type 1 or 2 Diabetes no     Renal Disease no     Autoimmune Disease  no      Contraindications to ASA therapy:  NSAID/ ASA allergy: no  Nasal polyps: no  Asthma with history of ASA induced bronchospasm: no  Relative contraindications:  History of GI bleed: no  Active peptic ulcer disease: no  Severe hepatic dysfunction: no    Patient should be recommended to take ASA 162mg during this pregnancy from 12-36wks to lower her risk of preeclampsia: n/a          The patient has a history now or in prior pregnancy notable for:  none        PN1 visit scheduled. The patient was oriented to our practice, the navigator role, reviewed delivering physicians and [facility] for Delivery. All questions were answered.    Interviewed by: Nahed WATTS, RN, BSN

## 2025-01-24 ENCOUNTER — TELEPHONE (OUTPATIENT)
Age: 40
End: 2025-01-24

## 2025-01-24 NOTE — TELEPHONE ENCOUNTER
"Self tanners are generally considered safe but there is no government agency that tests and reviews them. It is hard to provide recommendation because there are so many on the market.     Brands that market themselves as \"pregnancy safe\" are [employer] and [employer], because they claim they do not contain harmful ingredients. However, again, there is no agency that are proving these claims or provided testing on pregnant women."

## 2025-01-24 NOTE — TELEPHONE ENCOUNTER
10w6d OB patient calling in asking if she can use self hyun in pregnancy. Informed patient self tanning lotions are generally considered safe in pregnancy. Patient asking what type of products or ingredients she should avoid and if provider has any recommendations on specific brands she can use or to stay away from. Routing to provider for advice.

## 2025-01-24 NOTE — TELEPHONE ENCOUNTER
Called patient and reviewed provider recommendations. Patient verbalized understanding and appreciative.

## 2025-01-27 ENCOUNTER — INITIAL PRENATAL (OUTPATIENT)
Dept: OBGYN CLINIC | Facility: CLINIC | Age: 40
End: 2025-01-27

## 2025-01-27 ENCOUNTER — OFFICE VISIT (OUTPATIENT)
Facility: HOSPITAL | Age: 40
End: 2025-01-27
Payer: COMMERCIAL

## 2025-01-27 VITALS
DIASTOLIC BLOOD PRESSURE: 60 MMHG | HEIGHT: 63 IN | WEIGHT: 115 LBS | BODY MASS INDEX: 20.38 KG/M2 | SYSTOLIC BLOOD PRESSURE: 98 MMHG

## 2025-01-27 DIAGNOSIS — Z34.91 PRENATAL CARE IN FIRST TRIMESTER: Primary | ICD-10-CM

## 2025-01-27 DIAGNOSIS — Z36.0 ENCOUNTER FOR ANTENATAL SCREENING FOR CHROMOSOMAL ANOMALIES: ICD-10-CM

## 2025-01-27 DIAGNOSIS — O09.521 ADVANCED MATERNAL AGE IN MULTIGRAVIDA, FIRST TRIMESTER: Primary | ICD-10-CM

## 2025-01-27 DIAGNOSIS — Z31.5 ENCOUNTER FOR PROCREATIVE GENETIC COUNSELING: ICD-10-CM

## 2025-01-27 DIAGNOSIS — Z31.430 ENCOUNTER OF FEMALE FOR TESTING FOR GENETIC DISEASE CARRIER STATUS FOR PROCREATIVE MANAGEMENT: ICD-10-CM

## 2025-01-27 PROCEDURE — 36415 COLL VENOUS BLD VENIPUNCTURE: CPT | Performed by: OBSTETRICS & GYNECOLOGY

## 2025-01-27 PROCEDURE — PNV

## 2025-01-27 PROCEDURE — 87591 N.GONORRHOEAE DNA AMP PROB: CPT

## 2025-01-27 PROCEDURE — 87491 CHLMYD TRACH DNA AMP PROBE: CPT

## 2025-01-27 RX ORDER — GUAIFENESIN 600 MG/1
1200 TABLET, EXTENDED RELEASE ORAL EVERY 12 HOURS SCHEDULED
COMMUNITY
End: 2025-02-03

## 2025-01-27 RX ORDER — ALBUTEROL SULFATE 90 UG/1
1-2 INHALANT RESPIRATORY (INHALATION) EVERY 6 HOURS PRN
COMMUNITY
Start: 2025-01-24 | End: 2025-02-03

## 2025-01-27 RX ORDER — AZITHROMYCIN 250 MG/1
TABLET, FILM COATED ORAL
COMMUNITY
Start: 2025-01-24 | End: 2025-01-29

## 2025-01-27 NOTE — PROGRESS NOTES
Genetic Counseling Note    Appointment Date:  2025  Referred By: Milagros Albert CRNP  YOB: 1985  Partner:  David Courtney  Indication for Visit:  nuchal translucency/1st trimester screening  Pregnancy History:   Estimated Date of Delivery: 2025  Estimated Gestational Age: 11w2d    Chelle is a 39 y.o. female who presented with her partner David for genetic counseling to discuss genetic testing options in pregnancy. Chelle's first pregnancy (with a previous partner) was achieved through IVF, and she had preimplantation genetic testing (PGT) to find a healthy embryo. She is interested in NIPS for her current pregnancy as it was a spontaneous conception, so no screening has yet been performed.     We reviewed the patient's age related risk for chromosome abnormalities in pregnancy. The risk of Down syndrome at age 40 at delivery is 1/98, and the risk for any chromosomal abnormality at this age is 1/63. The differences between full chromosome aneuploidies and copy number variants (microdeletions and microduplications) was also discussed. We reviewed that copy number variants occur in about 0.4% of all pregnancies.      The risks, benefits, and limitations of amniocentesis were discussed with the patient. Amniocentesis is performed starting at 16 weeks gestation, using direct real time ultrasound visualization to avoid both the fetus and the placenta. Once amniotic fluid is withdrawn, laboratory analysis is performed and amniotic fluid alpha-fetoprotein, as well as chromosome and/or microarray analysis is undertaken. The risk of genetic amniocentesis includes, but is not limited to less than 1 in 300 pregnancy loss rate or  delivery rate if 23 weeks or greater, infection, bleeding, rupture of membranes, failure of cells to grow, karyotype error, laboratory error, etc. Occasionally a repeat amniocentesis is necessary due to cell culture failure. Chromosome/microarray analysis  from amniocentesis is 99.9% accurate and alpha-fetoprotein analysis can detect approximately 95% of open neural tube defects. Chorionic villus sampling (CVS) is another diagnostic testing option that is available earlier than amniocentesis, between 10-14 weeks gestation. Like amniocentesis, CVS is 99% accurate for detecting chromosomal problems. Unlike amniocentesis, CVS cannot detect alpha-fetoprotein levels in order to determine the risk for open neural tube defects. MSAFP testing would need to be performed at 15-20 weeks gestation for this purpose. The risk of CVS includes, but is not limited to, less than a 1 in 300 risk for pregnancy loss. There is also a 1% risk for maternal cell contamination and cell culture failure, in which case the CVS would need to be followed-up with amniocentesis.     We reviewed the testing option of cell free DNA screening (also known as noninvasive prenatal testing or NIPT), which can be done as early as 10 weeks gestation. We discussed that it is a serum test to identify fragments of placental DNA in maternal blood.  We reviewed the benefits and limitations of cell free DNA screening in detecting Down syndrome, Trisomy 13, Trisomy 18 and sex chromosome aneuploidies. We also discussed that cell free DNA screening does not detect additional chromosomal abnormalities and the possibility of a failed test result. As cell free DNA screening does not detect open neural tube defects, MSAFP screening is available at 15-20 weeks gestation.     We discussed the availability of an ultrasound between 11-14 weeks gestation to measure the nuchal translucency (NT), which can assess for chromosome abnormalities, cardiac defects, and other adverse pregnancy outcomes. We reviewed that level II anatomy ultrasound is typically performed at approximately 20 weeks gestation. Level II ultrasound evaluation is between 60-80% accurate in detecting major physical birth defects and variations in fetal  "development that may be associated with chromosome/genetic abnormalities. Level II ultrasound evaluation is not able to detect all birth defects or health problems.     After discussing the available prenatal screening and testing options Chelle elected to pursue cell free DNA screening. Her blood was drawn immediately after the counseling session. Results take approximately 5-7 days. She was informed that the results will disclose the fetal sex (if elected) and will be available in her MyChart to review. The patient declined CVS and amniocentesis secondary to procedural related complications. She may reconsider diagnostic testing should the cell free DNA screening come back abnormal. Chelle is also planning on pursuing NT ultrasound, MSAFP screening and Level II ultrasound at the appropriate times.     Histories for the patient and her partner's family were taken during our session. Further review of family history for the patient and her partner was noncontributory. The family history was not significant for genetic diseases or disorders, intellectual disability, birth defects, fetal loss, or consanguinity.    Patient reports being of Pashto descent and that her partner's ethnicity is unknown; he states he checks off \"other\" on forms as he does not know his background. She denies either of them having known Ashkenazi Nondenominational ancestry. The benefits and limitations of cystic fibrosis, spinal muscular atrophy, and hemoglobinopathy carrier screening were discussed. Chelle states that she had expanded carrier screening in 2015 as part of the IVF process, but the test report is not currently available for review. She would like to know as much as possible about her genetic risk in pregnancy. We discussed that if Chelle's carrier screening in the past found that she was a carrier for one or more conditions, the ordering provider may not have spent much time on the results if her partner screened negative for the " condition(s) in question. As her current partner is different, it makes sense to learn more about the risk for recessive conditions. We reviewed the option of expanded carrier screening. We discussed that the panels can test for carrier status for over 500 autosomal recessive and X-linked diseases. All of the diseases included on the panel are life threatening, life limiting, or have treatments available. After reviewing the benefits and limitations of expanded carrier screening Chelle elected to pursue the bloodwork for [employer] 613 carrier screening. Her blood was drawn immediately after the visit. Results will return within 2-3 weeks. We reviewed the out-of-pocket cost in the event insurance does not cover the testing.     Lastly, we discussed the fact that everyone in the general population regardless of age, family history, or medical background has approximately a 3-5% risk of having a child with some type of congenital anomaly, genetic disease or intellectual disability. Currently there are no tests available to rule out all birth defects or health problems.    Chelle was provided with our contact information. In the event that we need to reach Chelle, she prefers phone. I encouraged her to call with any questions or concerns.    Plan/Tests Ordered:  1) Patient declined CVS and amniocentesis at this time.  2) Patient elected [id number] NIPS with SCA and fetal sex (test code [id number]), blood drawn immediately after visit.  3) Patient elected [employer] 613 expanded carrier screening, blood drawn immediately after visit.  4) Nuchal translucency ultrasound scheduled on 2/3/2025 at [facility] site.    Time spent with Genetic Counselor: 38 minutes

## 2025-01-27 NOTE — PROGRESS NOTES
Patient chose to have [facility] Screening .  Patient choose to be billed through insurance.     Blood collection tubes labeled with patient identifiers (name, medical record number, and date of birth).     Filled out [facility] order form. Patient chose to have blood drawn in office in the right arm with a 21G straight needle by [facility] .      Patient verbalized understanding of all instructions and no questions at this time.

## 2025-01-27 NOTE — PROGRESS NOTES
Patient chose to have [facility] [id number] Non-Invasive Prenatal Screen [id number] PLUS w/ SCA, WITH fetal sex.  Patient choose to be billed through insurance.     Patient given brochure and is aware [facility] will contact patient's insurance and coordinate coverage.  Provided [facility] contact information. General inquiries [phone], Cost estimates [phone] and [facility] Billing [phone]. Website [facility].     Blood collection tubes labeled with patient identifiers (name, medical record number, and date of birth).     Filled out [facility] order form. Patient chose to have blood drawn in our office at time of visit. NIPS was drawn from right arm with a straight needle by [facility]. .      If patient chose to have blood work drawn at a [facility] lab we requested patient notify MFM (via phone call or [employer] message) when blood collected so office can follow up on results.       Maternal Fetal Medicine will have results in approximately 5-7 business days and will call patient or notify via [employer].  Patient aware viewing lab result online will reveal fetal sex if ordered.    Patient verbalized understanding of all instructions and no questions at this time.

## 2025-01-28 ENCOUNTER — RESULTS FOLLOW-UP (OUTPATIENT)
Dept: OBGYN CLINIC | Facility: CLINIC | Age: 40
End: 2025-01-28

## 2025-01-28 LAB
C TRACH DNA SPEC QL NAA+PROBE: NEGATIVE
N GONORRHOEA DNA SPEC QL NAA+PROBE: NEGATIVE

## 2025-01-28 NOTE — PROGRESS NOTES
I have reviewed the notes, assessments, and plan by Genetic Counselor, I concur with her documentation of Chelle Pugh.

## 2025-01-31 ENCOUNTER — RESULTS FOLLOW-UP (OUTPATIENT)
Facility: HOSPITAL | Age: 40
End: 2025-01-31

## 2025-01-31 LAB
CFDNA.FET/CFDNA.TOTAL SFR FETUS: NORMAL %
CITATION REF LAB TEST: NORMAL
FET 13+18+21+X+Y ANEUP PLAS.CFDNA: NEGATIVE
FET CHR 21 TS PLAS.CFDNA QL: NEGATIVE
FET CHR 21 TS PLAS.CFDNA QL: NEGATIVE
FET MS X RISK WBC.DNA+CFDNA QL: NOT DETECTED
FET SEX PLAS.CFDNA DOSAGE CFDNA: NORMAL
FET TS 13 RISK PLAS.CFDNA QL: NEGATIVE
FET X + Y ANEUP RISK PLAS.CFDNA SEQ-IMP: NOT DETECTED
GA EST FROM CONCEPTION DATE: NORMAL D
GESTATIONAL AGE > 9:: YES
LAB DIRECTOR NAME PROVIDER: NORMAL
LAB DIRECTOR NAME PROVIDER: NORMAL
LABORATORY COMMENT REPORT: NORMAL
LIMITATIONS OF THE TEST: NORMAL
NEGATIVE PREDICTIVE VALUE: NORMAL
PERFORMANCE CHARACTERISTICS: NORMAL
POSITIVE PREDICTIVE VALUE: NORMAL
REF LAB TEST METHOD: NORMAL
SL AMB NOTE:: NORMAL
TEST PERFORMANCE INFO SPEC: NORMAL

## 2025-01-31 NOTE — RESULT ENCOUNTER NOTE
I have reviewed the results of the NIPS which are low risk.  Please call patient and notify her of these reassuring results if she has not viewed on MyChart. Please ensure she is notified of recommendation of MSAFP to be ordered and followed up through her primary Obstetrician's office.      Thank you, Miguel Correa MD

## 2025-02-03 ENCOUNTER — ROUTINE PRENATAL (OUTPATIENT)
Facility: HOSPITAL | Age: 40
End: 2025-02-03
Payer: COMMERCIAL

## 2025-02-03 VITALS
HEART RATE: 58 BPM | OXYGEN SATURATION: 98 % | WEIGHT: 118.2 LBS | DIASTOLIC BLOOD PRESSURE: 64 MMHG | BODY MASS INDEX: 20.94 KG/M2 | SYSTOLIC BLOOD PRESSURE: 102 MMHG | HEIGHT: 63 IN

## 2025-02-03 DIAGNOSIS — Z3A.01 7 WEEKS GESTATION OF PREGNANCY: ICD-10-CM

## 2025-02-03 DIAGNOSIS — Z3A.12 12 WEEKS GESTATION OF PREGNANCY: Primary | ICD-10-CM

## 2025-02-03 DIAGNOSIS — O09.529 ANTEPARTUM MULTIGRAVIDA OF ADVANCED MATERNAL AGE: ICD-10-CM

## 2025-02-03 DIAGNOSIS — Z36.82 ENCOUNTER FOR ANTENATAL SCREENING FOR NUCHAL TRANSLUCENCY: ICD-10-CM

## 2025-02-03 PROCEDURE — 76813 OB US NUCHAL MEAS 1 GEST: CPT | Performed by: OBSTETRICS & GYNECOLOGY

## 2025-02-03 PROCEDURE — 99203 OFFICE O/P NEW LOW 30 MIN: CPT | Performed by: OBSTETRICS & GYNECOLOGY

## 2025-02-03 NOTE — PROGRESS NOTES
"[facility]: Ms. Pugh was seen today for nuchal translucency ultrasound.  See ultrasound report under \"OB Procedures\" tab.   The time spent on this established patient on the encounter date included 5 minutes previsit service time reviewing records and precharting, 10 minutes face-to-face service time counseling regarding results and coordinating care, and  5 minutes charting, totalling 20 minutes.  Please don't hesitate to contact our office with any concerns or questions.  -Sana Sauceda MD    "

## 2025-02-10 ENCOUNTER — NURSE TRIAGE (OUTPATIENT)
Age: 40
End: 2025-02-10

## 2025-02-10 NOTE — TELEPHONE ENCOUNTER
"OB 13w2d  started to have side sticker in left side of abdomen above hip area yesterday evening. Patient rates pain 5/10. States it occurs with movements - does not feel when sitting. Unsure if it could be muscular, did work out yesterday afternoon. Has not tried anything to help with pain. Patient is worried. Denies vaginal bleeding.     ESC sent to Dr. Hartman. Dr. Lopez added to ESC. RN summary of recommendations: Can try heading pad - not directly on the abdomen, tylenol and OTC lidocaine patch if needed.     Outgoing call to patient. Informed of providers recommendations and patient verbalized understanding. Will call back if pain increases or if unresolved with care advice.     Reason for Disposition   MILD abdominal pain (e.g., doesn't interfere with normal activities)    Answer Assessment - Initial Assessment Questions  1. LOCATION: \"Where does it hurt?\"       Left abdomen  2. RADIATION: \"Does the pain shoot anywhere else?\" (e.g., chest, back, shoulder)      denies  3. ONSET: \"When did the pain begin?\" (e.g., minutes, hours or days ago)       Last night  4. ONSET: \"Gradual or sudden onset?\"      sudden  5. PATTERN \"Does the pain come and go, or has it been constant since it started?\"       Comes and goes, occurs with movement  6. SEVERITY: \"How bad is the pain?\" \"What does it keep you from doing?\"  (e.g., Scale 1-10; mild, moderate, or severe)      5/10  7. RECURRENT SYMPTOM: \"Have you ever had this type of stomach pain before?\" If Yes, ask: \"When was the last time?\" and \"What happened that time?\"       denies  8. CAUSE: \"What do you think is causing the stomach pain?\"      unsure  9. RELIEVING/AGGRAVATING FACTORS: \"What makes it better or worse?\" (e.g., antacids, bowel movement, movement)      Movements increase pain  10. OTHER SYMPTOMS: \"Do you have any other symptoms?\" (e.g., back pain, diarrhea, fever, urination pain, vaginal bleeding, vaginal discharge, vomiting)        denies  11. NEFTALY: \"What " "date are you expecting to deliver?\"        8/16/25    Protocols used: Pregnancy - Abdominal Pain Less Than 20 Weeks EGA-Adult-[place]    "

## 2025-02-18 ENCOUNTER — RESULTS FOLLOW-UP (OUTPATIENT)
Facility: HOSPITAL | Age: 40
End: 2025-02-18

## 2025-02-26 ENCOUNTER — APPOINTMENT (OUTPATIENT)
Dept: LAB | Facility: CLINIC | Age: 40
End: 2025-02-26
Payer: COMMERCIAL

## 2025-02-26 DIAGNOSIS — Z34.81 PRENATAL CARE, SUBSEQUENT PREGNANCY, FIRST TRIMESTER: ICD-10-CM

## 2025-02-26 LAB
ABO GROUP BLD: NORMAL
BACTERIA UR QL AUTO: ABNORMAL /HPF
BASOPHILS # BLD AUTO: 0.04 THOUSANDS/ÂΜL (ref 0–0.1)
BASOPHILS NFR BLD AUTO: 0 % (ref 0–1)
BILIRUB UR QL STRIP: NEGATIVE
BLD GP AB SCN SERPL QL: NEGATIVE
CAOX CRY URNS QL MICRO: ABNORMAL /HPF
CLARITY UR: CLEAR
COLOR UR: ABNORMAL
EOSINOPHIL # BLD AUTO: 0.05 THOUSAND/ÂΜL (ref 0–0.61)
EOSINOPHIL NFR BLD AUTO: 1 % (ref 0–6)
ERYTHROCYTE [DISTWIDTH] IN BLOOD BY AUTOMATED COUNT: 13.2 % (ref 11.6–15.1)
GLUCOSE 1H P 50 G GLC PO SERPL-MCNC: 103 MG/DL (ref 70–134)
GLUCOSE UR STRIP-MCNC: NEGATIVE MG/DL
HCT VFR BLD AUTO: 37.3 % (ref 34.8–46.1)
HGB BLD-MCNC: 12.1 G/DL (ref 11.5–15.4)
HGB UR QL STRIP.AUTO: NEGATIVE
IMM GRANULOCYTES # BLD AUTO: 0.07 THOUSAND/UL (ref 0–0.2)
IMM GRANULOCYTES NFR BLD AUTO: 1 % (ref 0–2)
KETONES UR STRIP-MCNC: NEGATIVE MG/DL
LEUKOCYTE ESTERASE UR QL STRIP: NEGATIVE
LYMPHOCYTES # BLD AUTO: 1.44 THOUSANDS/ÂΜL (ref 0.6–4.47)
LYMPHOCYTES NFR BLD AUTO: 15 % (ref 14–44)
MCH RBC QN AUTO: 31.1 PG (ref 26.8–34.3)
MCHC RBC AUTO-ENTMCNC: 32.4 G/DL (ref 31.4–37.4)
MCV RBC AUTO: 96 FL (ref 82–98)
MONOCYTES # BLD AUTO: 0.57 THOUSAND/ÂΜL (ref 0.17–1.22)
MONOCYTES NFR BLD AUTO: 6 % (ref 4–12)
NEUTROPHILS # BLD AUTO: 7.39 THOUSANDS/ÂΜL (ref 1.85–7.62)
NEUTS SEG NFR BLD AUTO: 77 % (ref 43–75)
NITRITE UR QL STRIP: NEGATIVE
NON-SQ EPI CELLS URNS QL MICRO: ABNORMAL /HPF
NRBC BLD AUTO-RTO: 0 /100 WBCS
PH UR STRIP.AUTO: 6.5 [PH]
PLATELET # BLD AUTO: 171 THOUSANDS/UL (ref 149–390)
PMV BLD AUTO: 12.2 FL (ref 8.9–12.7)
PROT UR STRIP-MCNC: NEGATIVE MG/DL
RBC # BLD AUTO: 3.89 MILLION/UL (ref 3.81–5.12)
RBC #/AREA URNS AUTO: ABNORMAL /HPF
RH BLD: NEGATIVE
RUBV IGG SERPL IA-ACNC: 133.9 IU/ML
SP GR UR STRIP.AUTO: 1.02 (ref 1–1.03)
SPECIMEN EXPIRATION DATE: NORMAL
TSH SERPL DL<=0.05 MIU/L-ACNC: 0.62 UIU/ML (ref 0.45–4.5)
UROBILINOGEN UR STRIP-ACNC: <2 MG/DL
WBC # BLD AUTO: 9.56 THOUSAND/UL (ref 4.31–10.16)
WBC #/AREA URNS AUTO: ABNORMAL /HPF

## 2025-02-26 PROCEDURE — 83020 HEMOGLOBIN ELECTROPHORESIS: CPT

## 2025-02-26 PROCEDURE — 86850 RBC ANTIBODY SCREEN: CPT

## 2025-02-26 PROCEDURE — 81001 URINALYSIS AUTO W/SCOPE: CPT

## 2025-02-26 PROCEDURE — 82950 GLUCOSE TEST: CPT

## 2025-02-26 PROCEDURE — 86762 RUBELLA ANTIBODY: CPT

## 2025-02-26 PROCEDURE — 86803 HEPATITIS C AB TEST: CPT

## 2025-02-26 PROCEDURE — 87389 HIV-1 AG W/HIV-1&-2 AB AG IA: CPT

## 2025-02-26 PROCEDURE — 86780 TREPONEMA PALLIDUM: CPT

## 2025-02-26 PROCEDURE — 86901 BLOOD TYPING SEROLOGIC RH(D): CPT

## 2025-02-26 PROCEDURE — 84443 ASSAY THYROID STIM HORMONE: CPT

## 2025-02-26 PROCEDURE — 36415 COLL VENOUS BLD VENIPUNCTURE: CPT

## 2025-02-26 PROCEDURE — 86900 BLOOD TYPING SEROLOGIC ABO: CPT

## 2025-02-26 PROCEDURE — 85025 COMPLETE CBC W/AUTO DIFF WBC: CPT

## 2025-02-26 PROCEDURE — 87086 URINE CULTURE/COLONY COUNT: CPT

## 2025-02-26 PROCEDURE — 87340 HEPATITIS B SURFACE AG IA: CPT

## 2025-02-27 ENCOUNTER — RESULTS FOLLOW-UP (OUTPATIENT)
Dept: OBGYN CLINIC | Facility: CLINIC | Age: 40
End: 2025-02-27

## 2025-02-27 ENCOUNTER — ROUTINE PRENATAL (OUTPATIENT)
Dept: OBGYN CLINIC | Facility: CLINIC | Age: 40
End: 2025-02-27

## 2025-02-27 VITALS — DIASTOLIC BLOOD PRESSURE: 60 MMHG | SYSTOLIC BLOOD PRESSURE: 100 MMHG | BODY MASS INDEX: 22.5 KG/M2 | WEIGHT: 127 LBS

## 2025-02-27 DIAGNOSIS — O99.282 HYPOTHYROIDISM AFFECTING PREGNANCY IN SECOND TRIMESTER: ICD-10-CM

## 2025-02-27 DIAGNOSIS — Z36.89 ENCOUNTER FOR OTHER SPECIFIED ANTENATAL SCREENING: ICD-10-CM

## 2025-02-27 DIAGNOSIS — E03.9 HYPOTHYROIDISM AFFECTING PREGNANCY IN SECOND TRIMESTER: ICD-10-CM

## 2025-02-27 DIAGNOSIS — Z3A.15 15 WEEKS GESTATION OF PREGNANCY: Primary | ICD-10-CM

## 2025-02-27 LAB
BACTERIA UR CULT: NORMAL
HBV SURFACE AG SER QL: NORMAL
HCV AB SER QL: NORMAL
HIV 1+2 AB+HIV1 P24 AG SERPL QL IA: NORMAL
TREPONEMA PALLIDUM IGG+IGM AB [PRESENCE] IN SERUM OR PLASMA BY IMMUNOASSAY: NORMAL

## 2025-02-27 PROCEDURE — PNV: Performed by: NURSE PRACTITIONER

## 2025-02-27 RX ORDER — LEVOTHYROXINE SODIUM 112 UG/1
1 TABLET ORAL DAILY
COMMUNITY
Start: 2025-02-19

## 2025-02-27 NOTE — PROGRESS NOTES
"  OB/GYN  PN Visit  Chelle Pugh  [id number]  2025  11:32 AM   MAGALYS Henriquez    S: Chelle Pugh 39 y.o.  15w5d here for PN visit. She denies leakage of fluid and vaginal bleeding. She denies nausea, vomiting, headache, cramping, edema, domestic violence, and smoking, ETOH or drug. She does feel fullness/bloating and some vaginal pressure sometimes. Her pregnancy is complicated by AMA, hypothyroidism.     O:    Pre- Vitals      Flowsheet Row Most Recent Value   Prenatal Assessment    Fetal Heart Rate 152   Fundal Height (cm) 15 cm   Movement Absent   Prenatal Vitals    Blood Pressure 100/60   Weight - Scale 57.6 kg (127 lb)   Urine Albumin/Glucose    Dilation/Effacement/Station    Vaginal Drainage    Edema           Urine: neg/neg    A/P:    1. 15w5d GESTATION  - Continue PNV  - Labs: prenatal labs drawn yesterday. 1 hr glucose normal.   - Genetics: NIPS negative. AFP ordered  - Ultrasounds: NT scan normal.    -anatomy scan 3/25/25  - Tdap: offer at 28 weeks  - Flu Shot: Will offer in season  - COVID: vaccinated  - Rhogam: at 28 weeks.     - RTO in 4 weeks  or sooner if needed    USE THIS FOR OVERVIEW? (Can be edited in the \"Problem list\"  Problem List          Cardiovascular and Mediastinum    Hemorrhoid       Endocrine    Hypothyroidism affecting pregnancy in second trimester    Overview   25  TSH 3RD GENERATON 0.622   Repeat w/ 28w labs         Acquired hypothyroidism       Musculoskeletal and Integument    Melanoma in situ of lower leg, left (HCC)    Melanoma in situ of left lower extremity (HCC)       Obstetrics/Gynecology    Prenatal care in first trimester    Antepartum multigravida of advanced maternal age     Other Visit Diagnoses         15 weeks gestation of pregnancy    -  Primary      Encounter for other specified  screening                USE THIS FOR \"DAN\" CHARTING (can be edited in the note)  Assessment & Plan  15 weeks gestation of pregnancy     "     Encounter for other specified  screening    Orders:    Alpha fetoprotein, maternal; Future    Hypothyroidism affecting pregnancy in second trimester             Future Appointments   Date Time Provider Department Center   3/24/2025 11:15 AM MD ERIC Ramirez [facility]   3/25/2025 10:45 AM  [facility] 1 [facility]   2025 11:00 AM MD ERIC Maher [facility]   2025 11:00 AM MD ERIC Warner [facility]         MAGALYS Henriquez  2025  11:32 AM

## 2025-03-01 LAB
HGB A MFR BLD: 2.8 % (ref 1.8–3.2)
HGB A MFR BLD: 97.2 % (ref 96.4–98.8)
HGB F MFR BLD: 0 % (ref 0–2)
HGB FRACT BLD-IMP: NORMAL
HGB S MFR BLD: 0 %

## 2025-03-07 ENCOUNTER — APPOINTMENT (OUTPATIENT)
Dept: LAB | Facility: CLINIC | Age: 40
End: 2025-03-07
Payer: COMMERCIAL

## 2025-03-07 DIAGNOSIS — Z36.89 ENCOUNTER FOR OTHER SPECIFIED ANTENATAL SCREENING: ICD-10-CM

## 2025-03-07 PROCEDURE — 82105 ALPHA-FETOPROTEIN SERUM: CPT

## 2025-03-07 PROCEDURE — 36415 COLL VENOUS BLD VENIPUNCTURE: CPT

## 2025-03-12 LAB
2ND TRIMESTER 4 SCREEN SERPL-IMP: NORMAL
AFP ADJ MOM SERPL: 0.82
AFP INTERP AMN-IMP: NORMAL
AFP INTERP SERPL-IMP: NORMAL
AFP INTERP SERPL-IMP: NORMAL
AFP SERPL-MCNC: 34.4 NG/ML
AGE AT DELIVERY: 40.6 YR
GA METHOD: NORMAL
GA: 16.9 WEEKS
IDDM PATIENT QL: NO
MULTIPLE PREGNANCY: NO
NEURAL TUBE DEFECT RISK FETUS: NORMAL %

## 2025-03-13 ENCOUNTER — RESULTS FOLLOW-UP (OUTPATIENT)
Dept: LABOR AND DELIVERY | Facility: HOSPITAL | Age: 40
End: 2025-03-13

## 2025-03-16 ENCOUNTER — NURSE TRIAGE (OUTPATIENT)
Dept: OTHER | Facility: OTHER | Age: 40
End: 2025-03-16

## 2025-03-16 NOTE — TELEPHONE ENCOUNTER
"FOLLOW UP: Please call patient with recommendations    REASON FOR CONVERSATION: Rash    SYMPTOMS: mild, flat red rash on lower left side that appeared Saturday after a warm shower. Resolved. And then reappeared today after exercising.     OTHER: 18w1d    DISPOSITION: Home Care      Reason for Disposition   Mild localized rash    Answer Assessment - Initial Assessment Questions  1. APPEARANCE of RASH: \"Describe the rash.\"       Flat, red patch    2. LOCATION: \"Where is the rash located?\"       Lower left side    3. NUMBER: \"How many spots are there?\"       One area    5. ONSET: \"When did the rash start?\"       Appeared yesterday after taking a warm shower and then resolved    6. ITCHING: \"Does the rash itch?\" If Yes, ask: \"How bad is the itch?\"  (Scale 0-10; or none, mild, moderate, severe)      Does not itch, but is uncomfortable    7. PAIN: \"Does the rash hurt?\" If Yes, ask: \"How bad is the pain?\"  (Scale 0-10; or none, mild, moderate, severe)      Denies    8. OTHER SYMPTOMS: \"Do you have any other symptoms?\" (e.g., fever)      Denies    9. PREGNANCY: \"Is there any chance you are pregnant?\" \"When was your last menstrual period?\"      18w1d    Protocols used: Rash or Redness - Localized-Adult-AH    "

## 2025-03-17 ENCOUNTER — PATIENT MESSAGE (OUTPATIENT)
Dept: OBGYN CLINIC | Facility: CLINIC | Age: 40
End: 2025-03-17

## 2025-03-17 NOTE — TELEPHONE ENCOUNTER
Patient is 18w2d  calling to follow up with call from yesterday. Notes she took benadryl last night which mostly resolved the rash. Used Aquaphor this morning and it is gone again but its had back twice of the weekend. Feels hot when it occurs and is irritated by heat. Advise ice, and hydrocortisone cream in addition to aquaphor. Antihistamine okay. Patient sent photo via [email or web address]. Note it is not raised or bumpy. Note very itchy if at all.     Message to on call Dr. Almaraz.

## 2025-03-24 ENCOUNTER — ROUTINE PRENATAL (OUTPATIENT)
Dept: OBGYN CLINIC | Facility: CLINIC | Age: 40
End: 2025-03-24

## 2025-03-24 VITALS
HEIGHT: 63 IN | DIASTOLIC BLOOD PRESSURE: 62 MMHG | WEIGHT: 128 LBS | OXYGEN SATURATION: 100 % | BODY MASS INDEX: 22.68 KG/M2 | HEART RATE: 83 BPM | SYSTOLIC BLOOD PRESSURE: 100 MMHG

## 2025-03-24 DIAGNOSIS — Z34.92 PRENATAL CARE IN SECOND TRIMESTER: Primary | ICD-10-CM

## 2025-03-24 PROCEDURE — PNV: Performed by: STUDENT IN AN ORGANIZED HEALTH CARE EDUCATION/TRAINING PROGRAM

## 2025-03-24 NOTE — PROGRESS NOTES
Assessment/Plan  1. Prenatal care in second trimester  Overview:  - Continue PNV  - Trimester appropriate precautions reviewed   - Labs: 1st tri labs reviewed   -Pap: 04/2024  -Rh: A-  - Genetics: NIPT wnl, AFP wnl   - Ultrasounds: NT wnl, anatomy 3/25/25  - Tdap: 28 weeks  - Flu Shot: Will offer in season  - Rhogam: require at 28 weeks  - Delivery: TBD  - Contraception: TBD  - Breastfeeding: TBD  - Pediatrician: established  -Delivery Consent & Packet: 30 weeks  -GBS: 36 weeks  - RTO in 4 weeks  Assessment & Plan:    Chelle presents today for her 20 week visit.? She is currently 19w2d.      Vitals:    03/24/25 1100   BP: 100/62   Pulse: 83   SpO2: 100%         We reviewed exclusive breastfeeding helps your baby's gut grow lots of healthy bacteria and switching between formula and breastmilk can affect how that healthy bacteria grows. Using formula, especially in the first few weeks, can affect you milk supply. It's a supply and demand system for breast milk production. So if you are not removing milk from the breasts every time the baby eats, your supply may go down. If you need to supplement, by pediatrician recommendation, make sure that you are pumping every time baby is receiving formula. This will help to maintain your breast milk supply.   Risks of Supplementation     May interfere with learning how to breastfeed.     Alters the gut microbiome. (Good bacteria in the gut doesn't get a chance to flourish.)     Interrupts demand/cue based feeding, which is necessary for milk production     I have reviewed the patient's Level II u/s which was normal and reviewed the recommendations made by [facility] for additional testing and follow-up and have updated the problem list to reflect this.      I also discussed the patient's upcoming appointment at 24 weeks in which she will be given her 3rd trimester lab slips, education and a prescription for a breast pump if she intends to breastfeed.     All questions have been  answered to her satisfaction.               Subjective    Chelle is a 39 y.o. female,  with an Estimated Date of Delivery: 25 with a current gestational age of 19w2d. Patient reports no complaints- feeling much better now than in the first tri     History  The following portions of the patient's history were reviewed and updated as appropriate: allergies, current medications, past family history, past medical history, past social history, past surgical history and problem list.        Objective  Vitals:    25 1100   BP: 100/62   Pulse: 83   SpO2: 100%     FHT: 160  Urine: neg/neg

## 2025-03-24 NOTE — ASSESSMENT & PLAN NOTE
Chelle presents today for her 20 week visit.? She is currently 19w2d.      Vitals:    03/24/25 1100   BP: 100/62   Pulse: 83   SpO2: 100%         We reviewed exclusive breastfeeding helps your baby's gut grow lots of healthy bacteria and switching between formula and breastmilk can affect how that healthy bacteria grows. Using formula, especially in the first few weeks, can affect you milk supply. It's a supply and demand system for breast milk production. So if you are not removing milk from the breasts every time the baby eats, your supply may go down. If you need to supplement, by pediatrician recommendation, make sure that you are pumping every time baby is receiving formula. This will help to maintain your breast milk supply.   Risks of Supplementation     May interfere with learning how to breastfeed.     Alters the gut microbiome. (Good bacteria in the gut doesn't get a chance to flourish.)     Interrupts demand/cue based feeding, which is necessary for milk production     I have reviewed the patient's Level II u/s which was normal and reviewed the recommendations made by [facility] for additional testing and follow-up and have updated the problem list to reflect this.      I also discussed the patient's upcoming appointment at 24 weeks in which she will be given her 3rd trimester lab slips, education and a prescription for a breast pump if she intends to breastfeed.     All questions have been answered to her satisfaction.

## 2025-03-25 ENCOUNTER — RESULTS FOLLOW-UP (OUTPATIENT)
Dept: PERINATAL CARE | Facility: CLINIC | Age: 40
End: 2025-03-25

## 2025-03-25 ENCOUNTER — ROUTINE PRENATAL (OUTPATIENT)
Facility: HOSPITAL | Age: 40
End: 2025-03-25
Payer: COMMERCIAL

## 2025-03-25 VITALS
BODY MASS INDEX: 22.75 KG/M2 | HEIGHT: 63 IN | WEIGHT: 128.4 LBS | HEART RATE: 91 BPM | DIASTOLIC BLOOD PRESSURE: 54 MMHG | SYSTOLIC BLOOD PRESSURE: 96 MMHG

## 2025-03-25 DIAGNOSIS — O09.522 ELDERLY MULTIGRAVIDA, SECOND TRIMESTER: ICD-10-CM

## 2025-03-25 DIAGNOSIS — E03.9 HYPOTHYROIDISM DURING PREGNANCY IN SECOND TRIMESTER: ICD-10-CM

## 2025-03-25 DIAGNOSIS — Z3A.19 19 WEEKS GESTATION OF PREGNANCY: Primary | ICD-10-CM

## 2025-03-25 DIAGNOSIS — O99.282 HYPOTHYROIDISM DURING PREGNANCY IN SECOND TRIMESTER: ICD-10-CM

## 2025-03-25 DIAGNOSIS — Z36.86 ENCOUNTER FOR ANTENATAL SCREENING FOR CERVICAL LENGTH: ICD-10-CM

## 2025-03-25 PROCEDURE — 76817 TRANSVAGINAL US OBSTETRIC: CPT | Performed by: OBSTETRICS & GYNECOLOGY

## 2025-03-25 PROCEDURE — 76811 OB US DETAILED SNGL FETUS: CPT | Performed by: OBSTETRICS & GYNECOLOGY

## 2025-03-25 PROCEDURE — 99214 OFFICE O/P EST MOD 30 MIN: CPT | Performed by: OBSTETRICS & GYNECOLOGY

## 2025-03-25 NOTE — PROGRESS NOTES
Ultrasound Probe Disinfection    A transvaginal ultrasound was performed.   Prior to use, disinfection was performed with High Level Disinfection Process (Veritract).  Probe serial number [id number] [facility] [id number] was used.    Clementina Méndez  03/25/25  11:09 AM

## 2025-03-25 NOTE — PROGRESS NOTES
Please refer to the [facility] ultrasound report in Ob Procedures for additional information regarding today's visit

## 2025-03-25 NOTE — LETTER
March 25, 2025     Reyna Almaraz MD  [address]  [place] [address]    Patient: Chelle Pguh   YOB: 1985   Date of Visit: 3/25/2025       Dear Dr. Almaraz:    Thank you for referring Chelle Pugh to me for evaluation. Below are my notes for this consultation.    If you have questions, please do not hesitate to call me. I look forward to following your patient along with you.         Sincerely,        Arnoldo Stevenson MD        CC: No Recipients    Arnoldo Stevenson MD  3/25/2025 11:15 AM  Sign when Signing Visit  Please refer to the [facility] ultrasound report in Ob Procedures for additional information regarding today's visit

## 2025-04-22 ENCOUNTER — ROUTINE PRENATAL (OUTPATIENT)
Dept: OBGYN CLINIC | Facility: CLINIC | Age: 40
End: 2025-04-22

## 2025-04-22 VITALS — WEIGHT: 134 LBS | BODY MASS INDEX: 23.74 KG/M2 | SYSTOLIC BLOOD PRESSURE: 100 MMHG | DIASTOLIC BLOOD PRESSURE: 60 MMHG

## 2025-04-22 DIAGNOSIS — Z34.82 PRENATAL CARE, SUBSEQUENT PREGNANCY IN SECOND TRIMESTER: Primary | ICD-10-CM

## 2025-04-22 DIAGNOSIS — Z3A.23 23 WEEKS GESTATION OF PREGNANCY: ICD-10-CM

## 2025-04-22 DIAGNOSIS — Z67.91 RH NEGATIVE STATE IN ANTEPARTUM PERIOD: ICD-10-CM

## 2025-04-22 DIAGNOSIS — O26.899 RH NEGATIVE STATE IN ANTEPARTUM PERIOD: ICD-10-CM

## 2025-04-22 PROCEDURE — PNV: Performed by: STUDENT IN AN ORGANIZED HEALTH CARE EDUCATION/TRAINING PROGRAM

## 2025-04-22 NOTE — PROGRESS NOTES
Chelle is a 39 y.o.  23w3d. Reports ++FM, no LOF, VB, or regular contractions.     Vitals:    25 1100   BP: 100/60   S=D  +FHT  Assessment & Plan  Prenatal care, subsequent pregnancy in second trimester  - Continue PNV  - Trimester appropriate precautions reviewed   - Labs: 1st tri labs completed, AFP completed  [ ] third trim labs ordered  25  - Pap: 2024  - Rh: A-  - Genetics: NIPT wnl, AFP wnl   - Ultrasounds: NT wnl, anatomy 25  - Tdap: 28 weeks  - Flu Shot: Will offer in season  - Rhogam: require at 28 weeks  - Delivery: VAVD, would like   - Contraception: TBD  - Breastfeeding: TBD  - Pediatrician: established  - Delivery Consent & Packet: 30 weeks  - GBS: 36 weeks  - RTO in 4 weeks  Orders:    Ambulatory Referral to Physical Therapy; Future    Type and screen; Future    CBC and Platelet; Future    RPR-Syphilis Screening (Total Syphilis IGG/IGM); Future    Glucose, 1H PG; Future    23 weeks gestation of pregnancy    Orders:    Ambulatory Referral to Physical Therapy; Future    Type and screen; Future    CBC and Platelet; Future    RPR-Syphilis Screening (Total Syphilis IGG/IGM); Future    Glucose, 1H PG; Future    Rh negative state in antepartum period    Orders:    Type and screen; Future

## 2025-04-22 NOTE — ASSESSMENT & PLAN NOTE
- Continue PNV  - Trimester appropriate precautions reviewed   - Labs: 1st tri labs completed, AFP completed  [ ] third trim labs ordered  25  - Pap: 2024  - Rh: A-  - Genetics: NIPT wnl, AFP wnl   - Ultrasounds: NT wnl, anatomy 25  - Tdap: 28 weeks  - Flu Shot: Will offer in season  - Rhogam: require at 28 weeks  - Delivery: VAVD, would like   - Contraception: TBD  - Breastfeeding: TBD  - Pediatrician: established  - Delivery Consent & Packet: 30 weeks  - GBS: 36 weeks  - RTO in 4 weeks  Orders:    Ambulatory Referral to Physical Therapy; Future    Type and screen; Future    CBC and Platelet; Future    RPR-Syphilis Screening (Total Syphilis IGG/IGM); Future    Glucose, 1H PG; Future

## 2025-04-29 ENCOUNTER — ULTRASOUND (OUTPATIENT)
Facility: HOSPITAL | Age: 40
End: 2025-04-29
Payer: COMMERCIAL

## 2025-04-29 VITALS
DIASTOLIC BLOOD PRESSURE: 58 MMHG | HEIGHT: 63 IN | HEART RATE: 76 BPM | OXYGEN SATURATION: 99 % | SYSTOLIC BLOOD PRESSURE: 100 MMHG | BODY MASS INDEX: 23.88 KG/M2 | WEIGHT: 134.8 LBS

## 2025-04-29 DIAGNOSIS — Z3A.24 24 WEEKS GESTATION OF PREGNANCY: Primary | ICD-10-CM

## 2025-04-29 DIAGNOSIS — D03.72 MELANOMA IN SITU OF LOWER LEG, LEFT (HCC): ICD-10-CM

## 2025-04-29 DIAGNOSIS — O09.529 ANTEPARTUM MULTIGRAVIDA OF ADVANCED MATERNAL AGE: ICD-10-CM

## 2025-04-29 DIAGNOSIS — Z36.89 ENCOUNTER FOR ULTRASOUND TO ASSESS FETAL GROWTH: ICD-10-CM

## 2025-04-29 PROCEDURE — 76816 OB US FOLLOW-UP PER FETUS: CPT | Performed by: OBSTETRICS & GYNECOLOGY

## 2025-04-29 PROCEDURE — 99213 OFFICE O/P EST LOW 20 MIN: CPT | Performed by: OBSTETRICS & GYNECOLOGY

## 2025-04-29 NOTE — PROGRESS NOTES
"[facility]: Chelle Pugh was seen today at 24w3d for fetal growth assessment ultrasound.  See ultrasound report under \"OB Procedures\" tab.  Please don't hesitate to contact our office with any concerns or questions.  -Aziza Mcintyre MD    "

## 2025-04-29 NOTE — LETTER
"   Date: 2025    Nicolle Hernández MD  [address] [place] [address]  [address]  [place] [id number]    Patient: Chelle Pugh   YOB: 1985   Date of Visit: 2025   Gestational age 24w3d   Nature of this communication: Routine       This patient was seen recently in our  office.  Please see ultrasound report under \"OB Procedures\" tab.  Please don't hesitate to contact our office with any concerns or questions.      Sincerely,      Aziza Mcintyre MD  Attending Physician, Maternal-Fetal Medicine  [facility]    "

## 2025-05-14 ENCOUNTER — TELEPHONE (OUTPATIENT)
Age: 40
End: 2025-05-14

## 2025-05-14 NOTE — TELEPHONE ENCOUNTER
Patient called office to confirm she needs to have another glucose test done since she had it done. Advised she has a 1 hour glucose test ordered and sometimes they are done early and again later in pregnancy. Informed 1 hour glucose is typically done by 28 weeks. Will reach out to provider to confimr she needs 1 hour glucose test drawn.

## 2025-05-16 NOTE — TELEPHONE ENCOUNTER
Called patient and advised per Dr. Triplett :    she had an early 1 hour GCT to rule out pre-existing diabetes- we recommend this test be repeated at 28 weeks gestation to rule pout pregnancy caused diabetes.     Patient verbalized understanding.

## 2025-05-20 NOTE — PROGRESS NOTES
OB/GYN  PN Visit  Chelle Pugh  [id number]  2025  12:25 PM  Brittni Andradeley MAGALYS Coley    S: 39 y.o.  27w3d here for PN visit.   She denies contractions. She denies leakage of fluid and vaginal bleeding.   She endorses good fetal movement.   Her pregnancy is complicated by AMA.   She reports pelvic pressure during intercourse, working out and prolonged sitting, but always resolves. Referred previously to PFPT for symptoms, has not followed up.  Is asymptomatic today. No back pain, abdominal pian, vaginal discharge or urinary symptoms.     O:  Pre-Cheryl Vitals      Flowsheet Row Most Recent Value   Prenatal Assessment    Fetal Heart Rate 145   Fundal Height (cm) 27 cm   Movement Present   Prenatal Vitals    Blood Pressure 100/64   Weight - Scale 62.6 kg (138 lb)   Urine Albumin/Glucose    Dilation/Effacement/Station    Vaginal Drainage    Edema           Wt=62.6 kg (138 lb); Body mass index is 24.45 kg/m².; TWG=14.5 kg (32 lb)  Physical Exam    General: Well appearing, no distress  Respiratory: Unlabored breathing  Abdomen: Soft, gravid, nontender  Fundal Height: Appropriate for gestational age.   Extremities: Warm and well perfused.  Non tender.  OB exam completed: fundal height, +FHT.    A/P:    Problem List Items Addressed This Visit       Prenatal care in second trimester - Primary    - Continue PNV  - Trimester appropriate precautions reviewed   - Labs: 1st tri labs completed, AFP completed  third trim labs ordered  25  - Pap: 2024  - Rh: A-  - Genetics: NIPT wnl, AFP wnl   - Ultrasounds: NT wnl, anatomy 25  - Tdap: 28 weeks  - Flu Shot: Will offer in season  - Rhogam: require at 28 weeks  - Delivery: VAVD, would like   - Contraception: TBD  - Breastfeeding: TBD  - Pediatrician: established  - Delivery Consent & Packet: 30 weeks  - GBS: 36 weeks  - RTO in 4 weeks           Antepartum multigravida of advanced maternal age    32 week growth scan  AFPS surveillance starting  at 32 weeks         Hypothyroidism affecting pregnancy in second trimester    TSH 2/26/25 - 0.622   Repeat with 28 week labs          Relevant Orders    TSH, 3rd generation with Free T4 reflex       MAGALYS Alcazar  5/21/2025  12:25 PM

## 2025-05-21 ENCOUNTER — ROUTINE PRENATAL (OUTPATIENT)
Dept: OBGYN CLINIC | Facility: CLINIC | Age: 40
End: 2025-05-21

## 2025-05-21 VITALS — DIASTOLIC BLOOD PRESSURE: 64 MMHG | WEIGHT: 138 LBS | SYSTOLIC BLOOD PRESSURE: 100 MMHG | BODY MASS INDEX: 24.45 KG/M2

## 2025-05-21 DIAGNOSIS — O99.282 HYPOTHYROIDISM AFFECTING PREGNANCY IN SECOND TRIMESTER: ICD-10-CM

## 2025-05-21 DIAGNOSIS — O09.529 ANTEPARTUM MULTIGRAVIDA OF ADVANCED MATERNAL AGE: ICD-10-CM

## 2025-05-21 DIAGNOSIS — Z34.82 PRENATAL CARE, SUBSEQUENT PREGNANCY IN SECOND TRIMESTER: Primary | ICD-10-CM

## 2025-05-21 DIAGNOSIS — E03.9 HYPOTHYROIDISM AFFECTING PREGNANCY IN SECOND TRIMESTER: ICD-10-CM

## 2025-05-21 PROCEDURE — PNV

## 2025-05-21 NOTE — ASSESSMENT & PLAN NOTE
- Continue PNV  - Trimester appropriate precautions reviewed   - Labs: 1st tri labs completed, AFP completed  third trim labs ordered  25  - Pap: 2024  - Rh: A-  - Genetics: NIPT wnl, AFP wnl   - Ultrasounds: NT wnl, anatomy 25  - Tdap: 28 weeks  - Flu Shot: Will offer in season  - Rhogam: require at 28 weeks  - Delivery: VAVD, would like   - Contraception: TBD  - Breastfeeding: TBD  - Pediatrician: established  - Delivery Consent & Packet: 30 weeks  - GBS: 36 weeks  - RTO in 4 weeks

## 2025-05-30 ENCOUNTER — NURSE TRIAGE (OUTPATIENT)
Dept: OTHER | Facility: OTHER | Age: 40
End: 2025-05-30

## 2025-05-30 ENCOUNTER — RESULTS FOLLOW-UP (OUTPATIENT)
Age: 40
End: 2025-05-30

## 2025-05-30 ENCOUNTER — APPOINTMENT (OUTPATIENT)
Dept: LAB | Facility: CLINIC | Age: 40
End: 2025-05-30
Payer: COMMERCIAL

## 2025-05-30 DIAGNOSIS — O26.899 RH NEGATIVE STATE IN ANTEPARTUM PERIOD: ICD-10-CM

## 2025-05-30 DIAGNOSIS — Z3A.23 23 WEEKS GESTATION OF PREGNANCY: ICD-10-CM

## 2025-05-30 DIAGNOSIS — E03.9 HYPOTHYROIDISM AFFECTING PREGNANCY IN SECOND TRIMESTER: ICD-10-CM

## 2025-05-30 DIAGNOSIS — Z67.91 RH NEGATIVE STATE IN ANTEPARTUM PERIOD: ICD-10-CM

## 2025-05-30 DIAGNOSIS — Z34.82 PRENATAL CARE, SUBSEQUENT PREGNANCY IN SECOND TRIMESTER: ICD-10-CM

## 2025-05-30 DIAGNOSIS — O99.282 HYPOTHYROIDISM AFFECTING PREGNANCY IN SECOND TRIMESTER: ICD-10-CM

## 2025-05-30 LAB
ABO GROUP BLD: NORMAL
BLD GP AB SCN SERPL QL: NEGATIVE
ERYTHROCYTE [DISTWIDTH] IN BLOOD BY AUTOMATED COUNT: 12.7 % (ref 11.6–15.1)
GLUCOSE 1H P 50 G GLC PO SERPL-MCNC: 134 MG/DL (ref 70–134)
HCT VFR BLD AUTO: 33.1 % (ref 34.8–46.1)
HGB BLD-MCNC: 11.3 G/DL (ref 11.5–15.4)
MCH RBC QN AUTO: 32.5 PG (ref 26.8–34.3)
MCHC RBC AUTO-ENTMCNC: 34.1 G/DL (ref 31.4–37.4)
MCV RBC AUTO: 95 FL (ref 82–98)
PLATELET # BLD AUTO: 141 THOUSANDS/UL (ref 149–390)
PMV BLD AUTO: 12.2 FL (ref 8.9–12.7)
RBC # BLD AUTO: 3.48 MILLION/UL (ref 3.81–5.12)
RH BLD: NEGATIVE
SPECIMEN EXPIRATION DATE: NORMAL
TSH SERPL DL<=0.05 MIU/L-ACNC: 0.89 UIU/ML (ref 0.45–4.5)
WBC # BLD AUTO: 11.72 THOUSAND/UL (ref 4.31–10.16)

## 2025-05-30 PROCEDURE — 86901 BLOOD TYPING SEROLOGIC RH(D): CPT

## 2025-05-30 PROCEDURE — 86780 TREPONEMA PALLIDUM: CPT

## 2025-05-30 PROCEDURE — 86850 RBC ANTIBODY SCREEN: CPT

## 2025-05-30 PROCEDURE — 82950 GLUCOSE TEST: CPT

## 2025-05-30 PROCEDURE — 36415 COLL VENOUS BLD VENIPUNCTURE: CPT

## 2025-05-30 PROCEDURE — 84443 ASSAY THYROID STIM HORMONE: CPT

## 2025-05-30 PROCEDURE — 86900 BLOOD TYPING SEROLOGIC ABO: CPT

## 2025-05-30 PROCEDURE — 85027 COMPLETE CBC AUTOMATED: CPT

## 2025-05-31 LAB — TREPONEMA PALLIDUM IGG+IGM AB [PRESENCE] IN SERUM OR PLASMA BY IMMUNOASSAY: NORMAL

## 2025-05-31 NOTE — TELEPHONE ENCOUNTER
"REASON FOR CONVERSATION: Medication Management    SYMPTOMS: Cough    OTHER HEALTH INFORMATION: 29 weeks pregnant    PROTOCOL DISPOSITION: Home Care    CARE ADVICE PROVIDED:   Robitussin plain is the  the cough medication that is recommended during pregnancy..  PRACTICE FOLLOW-UP: May follow up on how she is feeling       Answer Assessment - Initial Assessment Questions  1. NAME of MEDICINE: \"What medicine(s) are you calling about?\"      Delsium VS other cough medications   2. QUESTION: \"What is your question?\" (e.g., double dose of medicine, side effect)      Can I take Delsium for my cough?    3. PRESCRIBER: \"Who prescribed the medicine?\" Reason: if prescribed by specialist, call should be referred to that group.      OTC  4. SYMPTOMS: \"Do you have any symptoms?\" If Yes, ask: \"What symptoms are you having?\"  \"How bad are the symptoms (e.g., mild, moderate, severe)      Cough   5. PREGNANCY:  \"Is there any chance that you are pregnant?\" \"When was your last menstrual period?\"      29 weeks    Protocols used: Medication Question Call-Adult-    "

## 2025-06-02 ENCOUNTER — RESULTS FOLLOW-UP (OUTPATIENT)
Dept: LABOR AND DELIVERY | Facility: HOSPITAL | Age: 40
End: 2025-06-02

## 2025-06-12 ENCOUNTER — HOSPITAL ENCOUNTER (OUTPATIENT)
Facility: HOSPITAL | Age: 40
Discharge: HOME | End: 2025-06-12
Attending: STUDENT IN AN ORGANIZED HEALTH CARE EDUCATION/TRAINING PROGRAM | Admitting: STUDENT IN AN ORGANIZED HEALTH CARE EDUCATION/TRAINING PROGRAM
Payer: COMMERCIAL

## 2025-06-12 VITALS — SYSTOLIC BLOOD PRESSURE: 95 MMHG | DIASTOLIC BLOOD PRESSURE: 54 MMHG | OXYGEN SATURATION: 100 % | HEART RATE: 78 BPM

## 2025-06-12 PROBLEM — R07.81 RIB PAIN: Status: ACTIVE | Noted: 2025-06-12

## 2025-06-12 LAB
ATRIAL RATE: 81 BPM
BACTERIA UR QL AUTO: ABNORMAL /HPF
BILIRUB UR QL STRIP: NEGATIVE
CLARITY UR: CLEAR
COLOR UR: YELLOW
GLUCOSE UR STRIP-MCNC: NEGATIVE MG/DL
HGB UR QL STRIP.AUTO: NEGATIVE
KETONES UR STRIP-MCNC: NEGATIVE MG/DL
LEUKOCYTE ESTERASE UR QL STRIP: NEGATIVE
NITRITE UR QL STRIP: NEGATIVE
NON-SQ EPI CELLS URNS QL MICRO: ABNORMAL /HPF
P AXIS: 41 DEGREES
PH UR STRIP.AUTO: 6.5 [PH] (ref 4.5–8)
PR INTERVAL: 138 MS
PROT UR STRIP-MCNC: ABNORMAL MG/DL
QRS AXIS: 53 DEGREES
QRSD INTERVAL: 84 MS
QT INTERVAL: 406 MS
QTC INTERVAL: 472 MS
RBC #/AREA URNS AUTO: ABNORMAL /HPF
SP GR UR STRIP.AUTO: 1.02 (ref 1–1.03)
T WAVE AXIS: 21 DEGREES
UROBILINOGEN UR QL STRIP.AUTO: 0.2 E.U./DL
VENTRICULAR RATE: 81 BPM
WBC #/AREA URNS AUTO: ABNORMAL /HPF

## 2025-06-12 PROCEDURE — 81001 URINALYSIS AUTO W/SCOPE: CPT

## 2025-06-12 PROCEDURE — 93010 ELECTROCARDIOGRAM REPORT: CPT | Performed by: INTERNAL MEDICINE

## 2025-06-12 PROCEDURE — 99214 OFFICE O/P EST MOD 30 MIN: CPT | Performed by: OBSTETRICS & GYNECOLOGY

## 2025-06-12 PROCEDURE — 93005 ELECTROCARDIOGRAM TRACING: CPT

## 2025-06-12 PROCEDURE — 99213 OFFICE O/P EST LOW 20 MIN: CPT

## 2025-06-12 RX ORDER — ACETAMINOPHEN 500 MG
500 TABLET ORAL EVERY 6 HOURS PRN
COMMUNITY

## 2025-06-12 NOTE — DISCHARGE INSTR - AVS FIRST PAGE
Medications and Pregnancy  The following list of over-the-counter medications is usually considered safe to take during pregnancy. Take care to not double up on products containing acetaminophen (Tylenol).   Colds/Sore Throat  Robitussin DM - Plain (guaifenesin)  Saline nasal spray  Warm salt water gargle  Cepacol throat lozenges or mouthwash (cetylpyridinium)  Sucrets (hexylresoricinol)  Allergy  AVOID the “D” - or DECONGESTANT  Claritin (loratadine)  Zrytec (cetirizine)  Allerga (fexofenadine)  Headaches / Aches and Pains  Tylenol (acetaminophen)  Do NOT exceed more than 3000 mg of Tylenol in a 24-hour period.  Heartburn  Mylanta (aluminum hydroxide/simethicone, magnesium hydroxide)  Maalaox (aluminum magnesium hydroxide, magnesium hydroxide)  Tums (calcium carbonate)  Riopan (magaldrate)  Constipation  Colace (docusate sodium)  Surfak (docusate sodium)  MiraLAX  Glycerin suppositories  Fleets enema (sodium phosphate & sodium biphosphate)  Nausea/Vomiting  Vitamin B6 (pyridoxine) - May take 50 mg at bedtime, 25 mg in the morning, 25 mg in the afternoon  Unisom (doxylamine) - May use for nausea/vomiting - (cut a 25 mg tablet in half). May cause drowsiness.   Sleep  Benadryl (diphenhydramine) - Take 1-2 tablets as needed at bedtime  Unisom (doxylamine) 25 mg tablet - As needed at bedtime  Melatonin 5 mg tablet - As needed at bedtime    Generally the generic form of medicine is usually lower priced than the brand name form of the medicine.   Talk to your healthcare provider before you take any medicines.  Many medicines may harm your baby if you take them when you are pregnant. Do not take any medicines, vitamins, herbs, or supplements without first talking to your healthcare provider.     Warning signs during pregnancy  [phone] Answering service during non-business hours     1. Vaginal bleeding  2. Sharp abdominal pain that does not go away  3. Fever (more than 100.4 and is not relieved by Tylenol)  4.  Persistent vomiting lasting greater than 24 hours  5. Chest pain   6. Pain or burning when you urinate  7. Severe headache that doesn't resolve with Tylenol  8. Blurred vision or seeing spots in your vision  9. Sudden swelling of your face or hands  10. Redness, swelling or pain in a leg  11. A sudden weight gain in just a few days  12. Decrease in your baby's movement (after 28 weeks or the 6th month of pregnancy)for 24 hrs  13. A loss of watery fluid from your vagina - can be a gush, a trickle or continuous wetness  14. After 20 weeks of pregnancy, rhythmic cramping (greater than 4 per hour) or menstrual like low/pelvic pain  15. You have cravings for substances such as ruperto, dirt, laundry starch, or ice.    Am I in labor?  As you enter the final stages of your pregnancy, your body will give signs that labor is approaching. The following information should help you to understand these signs and make it easier for you to determine whether you are in labour.  Some of the signs and symptoms of going into labor may include:  - Period-like cramps  - Backache  - Diarrhea  - Mucous discharge or ‘show’  - Gush or trickle of water as the membranes break  - Contractions    Engagement  As you move closer to delivery, your baby’s head may drop and become engaged in your pelvis in preparation for labour. If you are expecting your first baby, you may notice pressure in your groin and on your bladder beginning up to four weeks before the birth. You may also notice that you can breathe a little easier and have a little more appetite as your baby drops, and is not pushed up against your diaphragm and stomach quite so much. This is sometimes known as “lightening”, as women generally feel lighter.    Show  During your pregnancy a mucous plug fills your cervix. Towards the end of pregnancy, the cervix becomes softer and this mucous plug may become loosened and start to come away. The process of discharging this mucous is called a  "‘show’ and might often contain streaks of blood or may also be brownish in color. This is different from any flow of fresh blood which you would report immediately to your doctor or the hospital. The show may continue over a period of hours or even days. It is one of the signs that your body is preparing for birth. Labor may begin in the next few days, hours or weeks following a show. There is no need to phone the hospital if you have had a show.    Water breaking (rupture of membranes)  This may occur at any time prior to the start of labor, or at any time during labor. The break may be low, near the opening of the uterus, and will produce a gush of amniotic fluid. If this occurs, place a sanitary pad on and note the color of the fluid. Call the hospital. You will usually be asked to come in to the hospital.    Another type of amniotic fluid leak may occur higher up in the amniotic sack, or top of the uterus. This will be less obvious to you and you may only notice a trickle of fluid. Since many women have a heavy vaginal discharge or leak a small amount of urine towards the end of their pregnancy and it is often difficult to tell the difference between urine and amniotic fluid - urine is often yellow; where amniotic fluid is usually clear, or has a pink tinge, and has a \"sweet\" odor. If you are unsure, call the hospital.    If the color of the fluid is green or brownish, it indicates that your baby has passed a bowel movement (meconium) inside the uterus. It is very common to have meconium-stained amniotic fluid in a pregnancy over 41 weeks, but this may also be a sign that your baby is distressed. You will need to call the hospital immediately and then come into the hospital.    Contractions  Xu Rodríguez contractions  Stearns Rodríguez contractions are sometimes mistaken for labor. These “practice” contractions usually start [facility] through the pregnancy and continue right through to the end. These contractions are " often irregular and can be uncomfortable and tight. Preston Rodríguez contractions usually increase in regularity and strength towards the end of your pregnancy, preparing your uterus for the birth. Sometimes it is difficult to distinguish between these Preston Rodríguez contractions and labor contractions. Below are the common differences between the two.  Labor contractions  True labor contractions usually increase in strength and duration. In order to time your contractions, time the interval between the start of one contraction to the start of the next. Early labor contractions are often likened to period cramps with or without backache.    Preston Rodríguez contractions    Labor contractions    usually irregular and short    become regular with time    do not get closer together    get closer together with time    do not get stronger     become stronger    walking does not make them stronger  walking can make them stronger    lying down may make them go away   lying down does not make them go away    uncomfortable and tight--not painful   Painful - can't walk, talk or breathe through them    How does labor start?  Labor can start in different ways. You may be start experiencing some period like pains or contractions. You might notice that these tightenings/contractions start to get stronger, closer and last longer than before. Or you might start with some back ache or a stomach upset that gets stronger and develops into regular contractions. In approximately 10-15% of women, labour will start when your membranes rupture. Contractions usually follow.    Should I call my doctor?  You should call your doctor when:  - your hanson break  - you have bright blood loss  - your contractions are regular and five minutes apart  - if you have decreased fetal movement

## 2025-06-12 NOTE — ASSESSMENT & PLAN NOTE
Rib pain most likely musculoskeletal in nature due to heavy exercise history  No CVA tenderness, no dysuria, Urinalysis wnl  EKG wnl

## 2025-06-12 NOTE — PROGRESS NOTES
Progress Note - OB/GYN   Name: Chelle Pugh 39 y.o. female I MRN: [id number]  Unit/Bed#:  TRIAGE  I Date of Admission: 2025   Date of Service: 2025 I Hospital Day: 0     Assessment & Plan  Rib pain  Rib pain most likely musculoskeletal in nature due to heavy exercise history  No CVA tenderness, no dysuria, Urinalysis wnl  EKG wnl      ASSESSMENT:  Chelle Pugh is a 39 y.o.  at 30w5d who was evaluated today in triage for L Rib Pain. NST reactive with no contractions on tocometer. The patient does not appear to be in labor and her rib pain appears to be and it is safe to discharge home.     PLAN:  # 30 weeks gestation of pregnancy  - Continue routine prenatal care  - Discharge from OB triage with labor precautions    - Reviewed rupture of membranes, false vs true labor, decreased fetal movement, and vaginal bleeding   - Pt to call provider with any concerns and follow up at her next scheduled prenatal appointment    - Case discussed with Dr. Martha Munoz MD  OB/GYN PGY-1  2025 8:42 AM    SUBJECTIVE:    Chelle Pugh 39 y.o.  at 30w5d with an Estimated Date of Delivery: 25 presenting for left rib pain that she believes is secondary to excessive exercising, coughing after recent upper respiratory illness. She describes the pain as dull and has had some relief with a lidocaine patch and Tylenol. No vaginal bleeding, no loss of fluid, feeling baby move. Patient denies regular uterine contractions. Patient does not have headache, blurry vision, epigastric pain, or edema.  Her current pregnancy is notable for Rh Neg    ROS  General: No fevers, chills or night sweats  Cardiovascular: No chest pain, or wheezing  HEENT: No visual changes  Pulmonary: No cough,  GI: No diarrhea, no nausea, no blood in stools or black stools  : No dysuria or hematuria  Lower Extremity: Edema wnl for pregnancy    OBJECTIVE  Vitals:   Vitals:    25 0710   BP: 95/54    Pulse: 78   SpO2:      There is no height or weight on file to calculate BMI.  GBS: Not Tested  Blood type: A  Estimated Date of Delivery: 8/16/25    General Physical Exam:  General: Well appearing, no acute distress  Cardiovascular: normal  Lungs: non-labored breathing  Abdomen: Soft, Non-tender, Gravid, L. Upper rib tender to palpation  Lower extremities: Edema wnl for pregnancy    Fetal monitoring:  Fetal heart rate: Baseline Rate (FHR): 130 bpm  Variability: Moderate  Accelerations: 10 x 10 (<32 weeks), Present  Decelerations: None  [place]: Contraction Frequency (minutes): 0    Recent Results (from the past 44284 hours)   ECG 12 lead   Result Value    Ventricular Rate 81    Atrial Rate 81    SD Interval 138    QRSD Interval 84    QT Interval 406    QTC Interval 472    P Axis 41    QRS Axis 53    T Wave [place] 21       Gloria Munoz MD  6/12/2025  8:42 AM

## 2025-06-17 PROBLEM — Z34.93 PRENATAL CARE IN THIRD TRIMESTER: Status: ACTIVE | Noted: 2025-01-23

## 2025-06-17 PROBLEM — O99.283 HYPOTHYROIDISM AFFECTING PREGNANCY IN THIRD TRIMESTER: Status: ACTIVE | Noted: 2025-02-27

## 2025-06-19 NOTE — ASSESSMENT & PLAN NOTE
- Continue PNV  - Trimester appropriate precautions reviewed   - Labs: 1st tri labs completed, AFP completed  - Pap: 2024 NILM, neg HR-HPV  - Rh: A-  - Genetics: NIPT wnl, AFP wnl   - Ultrasounds:   NT wnl  Level II wnl  Growth 25 wk wnl  Growth 32 with NST/JOSLYN: 2025  Weekly JOSLYN/NST starting 32 week  - Tdap: 28 weeks - at next visit  - Flu Shot: Will offer in season  - Rhogam: given today  - Delivery: VAVD, would like   - Contraception: undecided.  - Breastfeeding: ordered today  - Pediatrician: established  - - Third trimester packet provided and reviewed:   - Birth room rules and acknowledgement, birth plan, authorization for release of protected health information for photographers and birth certificate/SS worksheet to be brought to hospital at time of delivery.   - FKC - 10 in 2 hours  -Perineal Massages to start at 34 weeks  -Last week of pregnancy and when to call  -Delivery Consent signed today .    - GBS: 36 weeks  - RTO in 2 weeks

## 2025-06-19 NOTE — PROGRESS NOTES
OB/GYN  PN Visit  Chelle Pugh  [id number]  2025  4:06 PM  MAGALYS Aquino    S: 39 y.o.  31w6d here for PN visit. Her pregnancy is complicated by listed.    She denies contractions. She denies leakage of fluid and vaginal bleeding.   She denies nausea, vomiting, headache, cramping, edema, and smoking.   Patient feels safe at home.  She reports  good fetal movement.    Recent bronchitis and pulled muscle in lower back. She had left rib pain related to coughing. She was evaluated on  at L&D for exacerbation of LUQ pain after exercise. She reports pain relieved after resting x 5 days. She noticed when she was resting; she noticed LLE swelling. She reports swelling has since subsided. She does not varicose veins but no areas of warm, tenderness, pain, or redness. Negative homans. VTE precautions reviewed in-detail.    O:  Pre-Cheryl Vitals      Flowsheet Row Most Recent Value   Prenatal Assessment    Fetal Heart Rate 140   Fundal Height (cm) 31 cm   Movement Present   Prenatal Vitals    Blood Pressure 94/60   Weight - Scale 62.6 kg (138 lb)   Urine Albumin/Glucose    Dilation/Effacement/Station    Vaginal Drainage    Edema           Wt=62.6 kg (138 lb); Body mass index is 24.45 kg/m².; TWG=14.5 kg (32 lb)    General: Well appearing, no distress.  OB exam completed: fundal height, +FHT.      A/P:    Problem List Items Addressed This Visit       Prenatal care in third trimester    - Continue PNV  - Trimester appropriate precautions reviewed   - Labs: 1st tri labs completed, AFP completed  - Pap: 2024 NILM, neg HR-HPV  - Rh: A-  - Genetics: NIPT wnl, AFP wnl   - Ultrasounds:   NT wnl  Level II wnl  Growth 25 wk wnl  Growth 32 with NST/JOSLYN: 2025  Weekly JOSLYN/NST starting 32 week  - Tdap: 28 weeks - at next visit  - Flu Shot: Will offer in season  - Rhogam: given today  - Delivery: VAVD, would like   - Contraception: undecided.  - Breastfeeding: ordered today  - Pediatrician:  established  - - Third trimester packet provided and reviewed:   - Birth room rules and acknowledgement, birth plan, authorization for release of protected health information for photographers and birth certificate/SS worksheet to be brought to hospital at time of delivery.   - FKC - 10 in 2 hours  -Perineal Massages to start at 34 weeks  -Last week of pregnancy and when to call  -Delivery Consent signed today .    - GBS: 36 weeks  - RTO in 2 weeks           Relevant Medications    Rho(D) immune globulin (RHOGAM ULTRA-FILTERED PLUS) IM injection 300 mcg    Antepartum multigravida of advanced maternal age    32 week growth scan  AFPS surveillance starting at 32 weeks         Hypothyroidism affecting pregnancy in third trimester - Primary    05/30 TSH WNL, levothyroxine 112mcg daily          Other Visit Diagnoses         Encounter for immunization                MAGALYS Aquino  6/20/2025  4:06 PM

## 2025-06-20 ENCOUNTER — ROUTINE PRENATAL (OUTPATIENT)
Dept: OBGYN CLINIC | Facility: CLINIC | Age: 40
End: 2025-06-20
Payer: COMMERCIAL

## 2025-06-20 VITALS
BODY MASS INDEX: 24.45 KG/M2 | WEIGHT: 138 LBS | HEIGHT: 63 IN | DIASTOLIC BLOOD PRESSURE: 60 MMHG | SYSTOLIC BLOOD PRESSURE: 94 MMHG

## 2025-06-20 DIAGNOSIS — O99.283 HYPOTHYROIDISM AFFECTING PREGNANCY IN THIRD TRIMESTER: Primary | ICD-10-CM

## 2025-06-20 DIAGNOSIS — Z34.83 PRENATAL CARE, SUBSEQUENT PREGNANCY IN THIRD TRIMESTER: ICD-10-CM

## 2025-06-20 DIAGNOSIS — Z23 ENCOUNTER FOR IMMUNIZATION: ICD-10-CM

## 2025-06-20 DIAGNOSIS — E03.9 HYPOTHYROIDISM AFFECTING PREGNANCY IN THIRD TRIMESTER: Primary | ICD-10-CM

## 2025-06-20 DIAGNOSIS — O09.529 ANTEPARTUM MULTIGRAVIDA OF ADVANCED MATERNAL AGE: ICD-10-CM

## 2025-06-20 LAB
DME PARACHUTE DELIVERY DATE REQUESTED: NORMAL
DME PARACHUTE ITEM DESCRIPTION: NORMAL
DME PARACHUTE ORDER STATUS: NORMAL
DME PARACHUTE SUPPLIER NAME: NORMAL
DME PARACHUTE SUPPLIER PHONE: NORMAL

## 2025-06-20 PROCEDURE — PNV

## 2025-06-20 PROCEDURE — 96372 THER/PROPH/DIAG INJ SC/IM: CPT

## 2025-06-20 NOTE — PROGRESS NOTES
Rhogam administered in left deltoid . Pt tolerated well, no previous reactions to any immunizations. Pt verbally verified name  and type of injection being given. Pt  given VIS at time of administration.   Yeni WARREN

## 2025-06-23 ENCOUNTER — ANCILLARY PROCEDURE (OUTPATIENT)
Facility: HOSPITAL | Age: 40
End: 2025-06-23
Payer: COMMERCIAL

## 2025-06-23 ENCOUNTER — ULTRASOUND (OUTPATIENT)
Facility: HOSPITAL | Age: 40
End: 2025-06-23
Payer: COMMERCIAL

## 2025-06-23 ENCOUNTER — TELEPHONE (OUTPATIENT)
Age: 40
End: 2025-06-23

## 2025-06-23 VITALS
WEIGHT: 138.2 LBS | SYSTOLIC BLOOD PRESSURE: 102 MMHG | DIASTOLIC BLOOD PRESSURE: 64 MMHG | HEART RATE: 77 BPM | HEIGHT: 63 IN | BODY MASS INDEX: 24.49 KG/M2

## 2025-06-23 DIAGNOSIS — Z3A.32 32 WEEKS GESTATION OF PREGNANCY: Primary | ICD-10-CM

## 2025-06-23 DIAGNOSIS — Z3A.32 32 WEEKS GESTATION OF PREGNANCY: ICD-10-CM

## 2025-06-23 DIAGNOSIS — O09.529 ANTEPARTUM MULTIGRAVIDA OF ADVANCED MATERNAL AGE: ICD-10-CM

## 2025-06-23 PROCEDURE — 76816 OB US FOLLOW-UP PER FETUS: CPT | Performed by: OBSTETRICS & GYNECOLOGY

## 2025-06-23 PROCEDURE — 99213 OFFICE O/P EST LOW 20 MIN: CPT | Performed by: OBSTETRICS & GYNECOLOGY

## 2025-06-23 PROCEDURE — 59025 FETAL NON-STRESS TEST: CPT | Performed by: OBSTETRICS & GYNECOLOGY

## 2025-06-23 NOTE — TELEPHONE ENCOUNTER
Incoming call from patient - had appt with MFM today. Concerned with US result indicating different NEFTALY. Advised NEFTALY is to remain the same.     Patient also inquiring if 8 year old son can attend NST/JOSLYN appointments. Advised only with additional adult to supervise. Patient verbalized understanding.

## 2025-06-23 NOTE — LETTER
NST sleeve cover sheet    Patient name: Chelle Pugh  : 1985  MRN: [id number]    NEFTALY: Estimated Date of Delivery: 25    Obstetrician: ***    Reason(s) for testing: ***    Testing frequency:    ___  2x/wk  ___  1x/wk  ___  Dopplers  ___  BPP?      Last growth scan: __________, _________, _________    Baby:      Male   /   Female   /   [place]             Baby Name: ___________________            IOL or  C/S: _____________________

## 2025-06-23 NOTE — PROGRESS NOTES
"The patient was seen today for an ultrasound and NST.  Please see ultrasound report (located under Imaging tab) for additional details.    On exam today, the patient appears well, in no acute distress, and denies any complaints.     There has been appropriate interval fetal growth. Good fetal movement and tone are seen. The amniotic fluid volume appears normal. The patient was informed of today's findings and all of her questions were answered. The limitations of ultrasound were reviewed.  labor precautions and fetal kick counts were reviewed.      The patient had questions regarding the safety of exercise in pregnancy and overall I reviewed that exercise is safe and encouraged in pregnancy as long as there are no complications such as fetal growth restriction or  labor.    We discussed follow-up in detail and I recommend the patient return for continuation of weekly fetal testing.      Thank you very much for allowing us to participate in the care of this very nice patient. Should you have any questions, please do not hesitate to contact our office.    Portions of the record may have been created with voice recognition software. Occasional wrong word or \"sound a like\" substitutions may have occurred due to the inherent limitations of voice recognition software. Read the chart carefully and recognize, using context, where substitutions have occurred.  "

## 2025-06-23 NOTE — PROGRESS NOTES
Repeat Non-Stress Testing:    Patient verbalizes +FM. Pt denies ALL:               Leaking of fluid   Contractions   Vaginal bleeding   Decreased fetal movement    Patient is performing daily kick counts. Patient has no questions or concerns.   NST strip reviewed by Dr. Correa in-person.

## 2025-06-27 NOTE — PROGRESS NOTES
OB/GYN  PN Visit  Chelle Pugh  [id number]  2025  4:53 PM  Milagros LUIS EmmadanielMAGALYS    S: 39 y.o.  33w3d here for PN visit. Her pregnancy is complicated by listed.    She denies contractions. She denies leakage of fluid and vaginal bleeding.   She denies nausea, vomiting, headache, cramping, edema, and smoking.   Patient feels safe at home.  She reports good fetal movement.    She reports lower extremity swelling; primarily, of the foot, at the end of the day. She has multiple varicose vessels in the foot. Negative homans, negative tenderness, redness.      O:  Pre-Cheryl Vitals      Flowsheet Row Most Recent Value   Prenatal Assessment    Fetal Heart Rate 150   Fundal Height (cm) 33 cm   Movement Present   Prenatal Vitals    Blood Pressure 114/78   Weight - Scale 63 kg (139 lb)   Urine Albumin/Glucose    Dilation/Effacement/Station    Vaginal Drainage    Edema           Wt=63 kg (139 lb); Body mass index is 24.62 kg/m².; TWG=15 kg (33 lb)    General: Well appearing, no distress.  OB exam completed: fundal height, +FHT.      A/P:    Problem List Items Addressed This Visit       Prenatal care in third trimester    - Continue PNV  - Trimester appropriate precautions reviewed   - Labs: 1st tri labs completed, AFP completed  - Pap: 2024 NILM, neg HR-HPV  - Rh: A-  - Genetics: NIPT wnl, AFP wnl   - Ultrasounds:   NT wnl  Level II wnl  Growth 25 wk wnl  Growth 32 with NST/JOSLYN: 2025  Weekly JOSLYN/NST starting 32 week  - Tdap: given today   - Flu Shot: Will offer in season  - Rhogam: given   - Delivery: VAVD, would like   - Contraception: undecided.  - Breastfeeding: ordered   - Pediatrician: established  - - Third trimester packet provided and reviewed:   -Delivery Consent signed  .    - GBS: 36 weeks  - RTO in 2 weeks           Hypothyroidism affecting pregnancy in third trimester - Primary     TSH WNL, levothyroxine 112mcg daily          Other Visit Diagnoses         Encounter  for immunization        Relevant Orders    Tdap Vaccine greater than or equal to 6yo            MAGALYS Aquino  7/1/2025  4:53 PM

## 2025-06-27 NOTE — ASSESSMENT & PLAN NOTE
- Continue PNV  - Trimester appropriate precautions reviewed   - Labs: 1st tri labs completed, AFP completed  - Pap: 2024 NILM, neg HR-HPV  - Rh: A-  - Genetics: NIPT wnl, AFP wnl   - Ultrasounds:   NT wnl  Level II wnl  Growth 25 wk wnl  Growth 32 with NST/JOSLYN: 2025  Weekly JOSLYN/NST starting 32 week  - Tdap: given today   - Flu Shot: Will offer in season  - Rhogam: given   - Delivery: VAVD, would like   - Contraception: undecided.  - Breastfeeding: ordered   - Pediatrician: established  - - Third trimester packet provided and reviewed:   -Delivery Consent signed  .    - GBS: 36 weeks  - RTO in 2 weeks

## 2025-06-30 ENCOUNTER — ANCILLARY PROCEDURE (OUTPATIENT)
Facility: HOSPITAL | Age: 40
End: 2025-06-30
Payer: COMMERCIAL

## 2025-06-30 ENCOUNTER — ULTRASOUND (OUTPATIENT)
Facility: HOSPITAL | Age: 40
End: 2025-06-30
Payer: COMMERCIAL

## 2025-06-30 VITALS
HEIGHT: 63 IN | WEIGHT: 138.4 LBS | DIASTOLIC BLOOD PRESSURE: 62 MMHG | HEART RATE: 88 BPM | SYSTOLIC BLOOD PRESSURE: 110 MMHG | BODY MASS INDEX: 24.52 KG/M2

## 2025-06-30 DIAGNOSIS — Z3A.33 33 WEEKS GESTATION OF PREGNANCY: Primary | ICD-10-CM

## 2025-06-30 DIAGNOSIS — O09.529 ANTEPARTUM MULTIGRAVIDA OF ADVANCED MATERNAL AGE: ICD-10-CM

## 2025-06-30 DIAGNOSIS — Z3A.33 33 WEEKS GESTATION OF PREGNANCY: ICD-10-CM

## 2025-06-30 PROCEDURE — 59025 FETAL NON-STRESS TEST: CPT | Performed by: OBSTETRICS & GYNECOLOGY

## 2025-06-30 PROCEDURE — 76815 OB US LIMITED FETUS(S): CPT | Performed by: OBSTETRICS & GYNECOLOGY

## 2025-07-01 ENCOUNTER — ROUTINE PRENATAL (OUTPATIENT)
Dept: OBGYN CLINIC | Facility: CLINIC | Age: 40
End: 2025-07-01
Payer: COMMERCIAL

## 2025-07-01 VITALS
SYSTOLIC BLOOD PRESSURE: 114 MMHG | DIASTOLIC BLOOD PRESSURE: 78 MMHG | BODY MASS INDEX: 24.63 KG/M2 | HEIGHT: 63 IN | WEIGHT: 139 LBS

## 2025-07-01 DIAGNOSIS — Z23 ENCOUNTER FOR IMMUNIZATION: ICD-10-CM

## 2025-07-01 DIAGNOSIS — O99.283 HYPOTHYROIDISM AFFECTING PREGNANCY IN THIRD TRIMESTER: Primary | ICD-10-CM

## 2025-07-01 DIAGNOSIS — Z34.83 PRENATAL CARE, SUBSEQUENT PREGNANCY IN THIRD TRIMESTER: ICD-10-CM

## 2025-07-01 DIAGNOSIS — E03.9 HYPOTHYROIDISM AFFECTING PREGNANCY IN THIRD TRIMESTER: Primary | ICD-10-CM

## 2025-07-01 PROCEDURE — 90471 IMMUNIZATION ADMIN: CPT

## 2025-07-01 PROCEDURE — 90715 TDAP VACCINE 7 YRS/> IM: CPT

## 2025-07-01 PROCEDURE — PNV

## 2025-07-01 NOTE — PROGRESS NOTES
Tdap administered in left deltoid per pt request. Pt tolerated well no previous history of adverse reactions to any immunizations. Pt given VIS at time of administration. Pt  verbally verified name  and vaccine being administered.     Yeni WARREN

## 2025-07-09 ENCOUNTER — ANCILLARY PROCEDURE (OUTPATIENT)
Facility: HOSPITAL | Age: 40
End: 2025-07-09
Attending: STUDENT IN AN ORGANIZED HEALTH CARE EDUCATION/TRAINING PROGRAM
Payer: COMMERCIAL

## 2025-07-09 ENCOUNTER — ULTRASOUND (OUTPATIENT)
Facility: HOSPITAL | Age: 40
End: 2025-07-09
Payer: COMMERCIAL

## 2025-07-09 VITALS
SYSTOLIC BLOOD PRESSURE: 102 MMHG | BODY MASS INDEX: 25.12 KG/M2 | DIASTOLIC BLOOD PRESSURE: 68 MMHG | WEIGHT: 141.8 LBS | HEART RATE: 78 BPM | HEIGHT: 63 IN

## 2025-07-09 DIAGNOSIS — Z3A.34 34 WEEKS GESTATION OF PREGNANCY: ICD-10-CM

## 2025-07-09 DIAGNOSIS — I78.1 SPIDER VEIN, SYMPTOMATIC: Primary | ICD-10-CM

## 2025-07-09 DIAGNOSIS — O09.529 ANTEPARTUM MULTIGRAVIDA OF ADVANCED MATERNAL AGE: ICD-10-CM

## 2025-07-09 PROCEDURE — 99213 OFFICE O/P EST LOW 20 MIN: CPT | Performed by: OBSTETRICS & GYNECOLOGY

## 2025-07-09 PROCEDURE — 76815 OB US LIMITED FETUS(S): CPT | Performed by: OBSTETRICS & GYNECOLOGY

## 2025-07-09 PROCEDURE — 59025 FETAL NON-STRESS TEST: CPT | Performed by: OBSTETRICS & GYNECOLOGY

## 2025-07-09 NOTE — ASSESSMENT & PLAN NOTE
We reviewed today's reassuring  surveillance. She had a few contractions on TOCO but denies any symptomatic contractions.  labor precautions were reviewed. Continued exercise in pregnancy was discussed and encouraged. Last growth was at the 91%ile. Follow up growth at 36 weeks was offered and accepted

## 2025-07-09 NOTE — PROGRESS NOTES
Repeat Non-Stress Testing:    Patient verbalizes +FM. Pt denies ALL:               Leaking of fluid   Contractions   Vaginal bleeding   Decreased fetal movement  Patient noted to have irregular contractions on EFM, states she does not feel. S/S PTL reviewed with patient and when to call OB. Advised to increase PO fluids.    Patient is performing daily kick counts. Patient has no questions or concerns.   NST strip reviewed by Dr. Harper in-person.

## 2025-07-09 NOTE — ASSESSMENT & PLAN NOTE
She has bilateral spider veins that are more pronounced in her left foot. She also has one on the inner upper right thigh that is visualized. None are erythematous or tender to touch but she states sometimes she has tenderness of the vulva more on one side than the other and more swelling of the left lower extremity. Visually the left lower extremity does appear more edematous than the other. There is no popliteal tenderness or tenderness over the areas of spider veins.    I recommended and ordered a bilateral lower extremity duplex study which she was advised to complete.

## 2025-07-09 NOTE — PROGRESS NOTES
"[facility] return visit      HPI: Chelle Pugh is a 39 y.o.  at 34w4d who presents today for NST (found under the pregnancy episode) which I reviewed the RN assessment and agree, and JOSLYN (see ultrasound report under imaging tab) at our [facility]   See ultrasound report under \"imaging\" tab.         She has lower extremity edema and spider veins. She denies any symptoms of contractions/VB/LOF and has normal FM    Vitals:    25 1546   BP: 102/68   Pulse: 78     Problem List Items Addressed This Visit       Antepartum multigravida of advanced maternal age    We reviewed today's reassuring  surveillance. She had a few contractions on TOCO but denies any symptomatic contractions.  labor precautions were reviewed. Continued exercise in pregnancy was discussed and encouraged. Last growth was at the 91%ile. Follow up growth at 36 weeks was offered and accepted         Spider vein, symptomatic - Primary    She has bilateral spider veins that are more pronounced in her left foot. She also has one on the inner upper right thigh that is visualized. None are erythematous or tender to touch but she states sometimes she has tenderness of the vulva more on one side than the other and more swelling of the left lower extremity. Visually the left lower extremity does appear more edematous than the other. There is no popliteal tenderness or tenderness over the areas of spider veins.    I recommended and ordered a bilateral lower extremity duplex study which she was advised to complete.         Relevant Orders    VAS ARTERIAL DUPLEX- LOWER LIMB BILATERAL     Other Visit Diagnoses         34 weeks gestation of pregnancy        Relevant Orders    VAS ARTERIAL DUPLEX- LOWER LIMB BILATERAL            Summary of recommended follow up:  Weekly NST/JOSLYN  Growth at 36 weeks    The time spent on this established patient on the encounter date included 6 minutes previsit service time reviewing " "records and precharting, 12 minutes face-to-face service time counseling regarding results and coordinating care, and  10 minutes charting, totalling 28 minutes.         Please see today's [facility] ultrasound report documented separately under \"imaging\" tab in Epic.    Please don't hesitate to contact our office with any concerns or questions.    -Denise Harper MD         "

## 2025-07-15 ENCOUNTER — ROUTINE PRENATAL (OUTPATIENT)
Dept: OBGYN CLINIC | Facility: CLINIC | Age: 40
End: 2025-07-15

## 2025-07-15 VITALS
SYSTOLIC BLOOD PRESSURE: 100 MMHG | WEIGHT: 142 LBS | DIASTOLIC BLOOD PRESSURE: 68 MMHG | BODY MASS INDEX: 25.16 KG/M2 | HEIGHT: 63 IN

## 2025-07-15 DIAGNOSIS — E03.9 HYPOTHYROIDISM AFFECTING PREGNANCY IN THIRD TRIMESTER: Primary | ICD-10-CM

## 2025-07-15 DIAGNOSIS — O09.529 ANTEPARTUM MULTIGRAVIDA OF ADVANCED MATERNAL AGE: ICD-10-CM

## 2025-07-15 DIAGNOSIS — O99.283 HYPOTHYROIDISM AFFECTING PREGNANCY IN THIRD TRIMESTER: Primary | ICD-10-CM

## 2025-07-15 DIAGNOSIS — Z34.83 PRENATAL CARE, SUBSEQUENT PREGNANCY IN THIRD TRIMESTER: ICD-10-CM

## 2025-07-15 PROCEDURE — PNV

## 2025-07-16 ENCOUNTER — ULTRASOUND (OUTPATIENT)
Facility: HOSPITAL | Age: 40
End: 2025-07-16
Payer: COMMERCIAL

## 2025-07-16 ENCOUNTER — ANCILLARY PROCEDURE (OUTPATIENT)
Facility: HOSPITAL | Age: 40
End: 2025-07-16
Attending: STUDENT IN AN ORGANIZED HEALTH CARE EDUCATION/TRAINING PROGRAM
Payer: COMMERCIAL

## 2025-07-16 VITALS
BODY MASS INDEX: 25.27 KG/M2 | WEIGHT: 142.6 LBS | HEIGHT: 63 IN | HEART RATE: 84 BPM | SYSTOLIC BLOOD PRESSURE: 92 MMHG | DIASTOLIC BLOOD PRESSURE: 50 MMHG

## 2025-07-16 DIAGNOSIS — Z3A.35 35 WEEKS GESTATION OF PREGNANCY: ICD-10-CM

## 2025-07-16 DIAGNOSIS — Z3A.35 35 WEEKS GESTATION OF PREGNANCY: Primary | ICD-10-CM

## 2025-07-16 PROCEDURE — 59025 FETAL NON-STRESS TEST: CPT | Performed by: OBSTETRICS & GYNECOLOGY

## 2025-07-16 PROCEDURE — 76815 OB US LIMITED FETUS(S): CPT | Performed by: OBSTETRICS & GYNECOLOGY

## 2025-07-16 NOTE — PROGRESS NOTES
Repeat Non-Stress Testing:    Patient verbalizes +FM. Pt denies ALL:               Leaking of fluid   Contractions   Vaginal bleeding   Decreased fetal movement    Patient is performing daily kick counts. Patient has no questions or concerns.   NST strip reviewed by Dr. Harper in-person.

## 2025-07-18 PROBLEM — Z3A.35 35 WEEKS GESTATION OF PREGNANCY: Status: ACTIVE | Noted: 2025-04-29

## 2025-07-22 ENCOUNTER — ROUTINE PRENATAL (OUTPATIENT)
Dept: OBGYN CLINIC | Facility: CLINIC | Age: 40
End: 2025-07-22

## 2025-07-22 VITALS — BODY MASS INDEX: 25.19 KG/M2 | SYSTOLIC BLOOD PRESSURE: 98 MMHG | WEIGHT: 142.2 LBS | DIASTOLIC BLOOD PRESSURE: 62 MMHG

## 2025-07-22 DIAGNOSIS — Z3A.36 36 WEEKS GESTATION OF PREGNANCY: Primary | ICD-10-CM

## 2025-07-22 PROCEDURE — PNV: Performed by: STUDENT IN AN ORGANIZED HEALTH CARE EDUCATION/TRAINING PROGRAM

## 2025-07-22 PROCEDURE — 87150 DNA/RNA AMPLIFIED PROBE: CPT | Performed by: STUDENT IN AN ORGANIZED HEALTH CARE EDUCATION/TRAINING PROGRAM

## 2025-07-22 NOTE — PROGRESS NOTES
[facility] Visit  Chelle Pugh  [id number]  2025  10:59 AM  Tequila Triplett MD    S: 39 y.o.  36w3d here for PN visit-she denies any contractions, loss of fluid or vaginal bleeding.  She endorses good fetal movement.  She notes some left edema secondary to varicosities.  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 36w3d GESTATION  - Continue PNV  - Trimester appropriate precautions reviewed   - Labs: 1st tri labs completed, AFP completed  - Pap: 2024 NILM, neg HR-HPV  - Rh: A-  - Genetics: NIPT wnl, AFP wnl   - Ultrasounds:   NT wnl  Level II wnl  Growth 25 wk wnl  Growth 32 with NST/JOSLYN: 2025  Continue weekly NST/JOSLYN until delivery  Follow-up 36-week growth, last growth 91st percentile  - Tdap: given today   - Flu Shot: Will offer in season  - Rhogam: given   - Delivery: VAVD, would like  reviewed given concern for possible fetal macrosomia recommendations for 39-week induction of labor which patient   Is interested in  - Contraception: undecided.  - Breastfeeding: ordered   - Pediatrician: established  - - Third trimester packet provided and reviewed:   -Delivery Consent signed  .    - GBS: Collected today and reviewed  - Labor precautions and fetal kick counts reviewed  - RTO in 1 weeks        Tequila Triplett MD  2025  10:59 AM

## 2025-07-24 ENCOUNTER — ANCILLARY PROCEDURE (OUTPATIENT)
Facility: HOSPITAL | Age: 40
End: 2025-07-24
Attending: PHYSICIAN ASSISTANT
Payer: COMMERCIAL

## 2025-07-24 ENCOUNTER — ULTRASOUND (OUTPATIENT)
Facility: HOSPITAL | Age: 40
End: 2025-07-24
Payer: COMMERCIAL

## 2025-07-24 VITALS
HEIGHT: 63 IN | WEIGHT: 144 LBS | SYSTOLIC BLOOD PRESSURE: 104 MMHG | BODY MASS INDEX: 25.52 KG/M2 | DIASTOLIC BLOOD PRESSURE: 56 MMHG | HEART RATE: 74 BPM

## 2025-07-24 DIAGNOSIS — Z3A.36 36 WEEKS GESTATION OF PREGNANCY: ICD-10-CM

## 2025-07-24 DIAGNOSIS — O09.529 ANTEPARTUM MULTIGRAVIDA OF ADVANCED MATERNAL AGE: Primary | ICD-10-CM

## 2025-07-24 LAB — GP B STREP DNA SPEC QL NAA+PROBE: NEGATIVE

## 2025-07-24 PROCEDURE — NC001 PR NO CHARGE: Performed by: OBSTETRICS & GYNECOLOGY

## 2025-07-24 PROCEDURE — 76816 OB US FOLLOW-UP PER FETUS: CPT | Performed by: OBSTETRICS & GYNECOLOGY

## 2025-07-24 PROCEDURE — 59025 FETAL NON-STRESS TEST: CPT

## 2025-07-24 NOTE — PROGRESS NOTES
Repeat Non-Stress Testing:    Patient verbalizes +FM. Pt denies ALL:               Leaking of fluid   Contractions   Vaginal bleeding   Decreased fetal movement    Patient is performing daily kick counts. Patient has no questions or concerns.   NST strip reviewed by MAGALYS Roldan in-person.

## 2025-07-24 NOTE — ASSESSMENT & PLAN NOTE
Discussed the appropriate for gestational age growth at the 86th percentile and normal amniotic fluid. Nonstress test was reactive without decelerations.    No further ultrasounds were scheduled at this time.  She will continue with weekly antepartum fetal surveillance for the indication of advanced maternal age.

## 2025-07-24 NOTE — PROGRESS NOTES
"FOLLOW-UP: MATERNAL-FETAL MEDICINE  Name: Chelle Pugh      : 1985      MRN: [id number]  Encounter Provider: Leroy Green RN  Encounter Date: 2025   Encounter department: [facility]  :  Assessment & Plan  Antepartum multigravida of advanced maternal age  Will return for her weekly NSTs and weekly JOSLYN's.  Continue to recommend delivery by NEFTALY.  36 weeks gestation of pregnancy  Discussed the appropriate for gestational age growth at the 86th percentile and normal amniotic fluid. Nonstress test was reactive without decelerations.    No further ultrasounds were scheduled at this time.  She will continue with weekly antepartum fetal surveillance for the indication of advanced maternal age.    History of Present Illness     Chelle Pugh is a 39 y.o. female  at 30w1d who presents for fetal growth assessment ultrasound and nonstress test. She reports no obstetric complaints today.    Objective   /56 (BP Location: Right arm, Patient Position: Sitting, Cuff Size: Standard)   Pulse 74   Ht 5' 3\" (1.6 m)   Wt 65.3 kg (144 lb)   LMP 2024   BMI 25.51 kg/m²      Patient's last menstrual period was 2024.  Estimated Delivery Date: 2025, by Last Menstrual Period    Please refer to \"Imaging\" for ultrasound report from today's visit.     Administrative Statements   MDM:  I. Diagnoses/Problems addressed: AMA  II.  Data: I reviewed OB notes and prenatal lab tests ordered by another provider.  III.  Risk of morbidity: Low    Evaluation and Management:   Medical decision making for this encounter was low.    MAGALYS Roldan  Maternal-Fetal Medicine  "

## 2025-07-28 ENCOUNTER — TELEPHONE (OUTPATIENT)
Age: 40
End: 2025-07-28

## 2025-07-29 ENCOUNTER — NURSE TRIAGE (OUTPATIENT)
Dept: OTHER | Facility: OTHER | Age: 40
End: 2025-07-29

## 2025-07-29 ENCOUNTER — HOSPITAL ENCOUNTER (OUTPATIENT)
Facility: HOSPITAL | Age: 40
Discharge: HOME | End: 2025-07-29
Attending: OBSTETRICS & GYNECOLOGY | Admitting: OBSTETRICS & GYNECOLOGY
Payer: COMMERCIAL

## 2025-07-29 ENCOUNTER — ULTRASOUND (OUTPATIENT)
Facility: HOSPITAL | Age: 40
End: 2025-07-29
Payer: COMMERCIAL

## 2025-07-29 ENCOUNTER — ANCILLARY PROCEDURE (OUTPATIENT)
Facility: HOSPITAL | Age: 40
End: 2025-07-29
Attending: STUDENT IN AN ORGANIZED HEALTH CARE EDUCATION/TRAINING PROGRAM
Payer: COMMERCIAL

## 2025-07-29 VITALS
DIASTOLIC BLOOD PRESSURE: 70 MMHG | OXYGEN SATURATION: 100 % | WEIGHT: 144 LBS | RESPIRATION RATE: 18 BRPM | HEART RATE: 76 BPM | TEMPERATURE: 98.3 F | SYSTOLIC BLOOD PRESSURE: 103 MMHG | HEIGHT: 63 IN | BODY MASS INDEX: 25.52 KG/M2

## 2025-07-29 VITALS
HEART RATE: 85 BPM | HEIGHT: 63 IN | SYSTOLIC BLOOD PRESSURE: 106 MMHG | WEIGHT: 146.2 LBS | DIASTOLIC BLOOD PRESSURE: 64 MMHG | BODY MASS INDEX: 25.91 KG/M2

## 2025-07-29 DIAGNOSIS — Z3A.36 36 WEEKS GESTATION OF PREGNANCY: ICD-10-CM

## 2025-07-29 DIAGNOSIS — Z3A.37 37 WEEKS GESTATION OF PREGNANCY: Primary | ICD-10-CM

## 2025-07-29 DIAGNOSIS — O09.523 ELDERLY MULTIGRAVIDA, THIRD TRIMESTER: ICD-10-CM

## 2025-07-29 PROBLEM — R10.9 ABDOMINAL CRAMPING AFFECTING PREGNANCY: Status: ACTIVE | Noted: 2025-07-29

## 2025-07-29 PROBLEM — O26.899 ABDOMINAL CRAMPING AFFECTING PREGNANCY: Status: ACTIVE | Noted: 2025-07-29

## 2025-07-29 PROCEDURE — NC001 PR NO CHARGE: Performed by: OBSTETRICS & GYNECOLOGY

## 2025-07-29 PROCEDURE — 99213 OFFICE O/P EST LOW 20 MIN: CPT

## 2025-07-29 PROCEDURE — 76815 OB US LIMITED FETUS(S): CPT

## 2025-07-29 PROCEDURE — 59025 FETAL NON-STRESS TEST: CPT | Performed by: OBSTETRICS & GYNECOLOGY

## 2025-07-31 ENCOUNTER — ROUTINE PRENATAL (OUTPATIENT)
Dept: OBGYN CLINIC | Facility: CLINIC | Age: 40
End: 2025-07-31

## 2025-07-31 ENCOUNTER — TELEPHONE (OUTPATIENT)
Dept: OBGYN CLINIC | Facility: CLINIC | Age: 40
End: 2025-07-31

## 2025-07-31 VITALS — SYSTOLIC BLOOD PRESSURE: 110 MMHG | DIASTOLIC BLOOD PRESSURE: 70 MMHG | WEIGHT: 144 LBS | BODY MASS INDEX: 25.51 KG/M2

## 2025-07-31 DIAGNOSIS — Z34.83 PRENATAL CARE, SUBSEQUENT PREGNANCY IN THIRD TRIMESTER: Primary | ICD-10-CM

## 2025-07-31 DIAGNOSIS — E03.9 HYPOTHYROIDISM AFFECTING PREGNANCY IN THIRD TRIMESTER: ICD-10-CM

## 2025-07-31 DIAGNOSIS — O99.283 HYPOTHYROIDISM AFFECTING PREGNANCY IN THIRD TRIMESTER: ICD-10-CM

## 2025-07-31 PROCEDURE — PNV: Performed by: STUDENT IN AN ORGANIZED HEALTH CARE EDUCATION/TRAINING PROGRAM

## 2025-08-04 ENCOUNTER — ROUTINE PRENATAL (OUTPATIENT)
Dept: OBGYN CLINIC | Facility: CLINIC | Age: 40
End: 2025-08-04

## 2025-08-04 VITALS
BODY MASS INDEX: 25.52 KG/M2 | DIASTOLIC BLOOD PRESSURE: 82 MMHG | SYSTOLIC BLOOD PRESSURE: 110 MMHG | HEIGHT: 63 IN | WEIGHT: 144 LBS

## 2025-08-04 DIAGNOSIS — Z34.83 PRENATAL CARE, SUBSEQUENT PREGNANCY IN THIRD TRIMESTER: ICD-10-CM

## 2025-08-04 DIAGNOSIS — I78.1 SPIDER VEIN, SYMPTOMATIC: Primary | ICD-10-CM

## 2025-08-04 DIAGNOSIS — E03.9 HYPOTHYROIDISM AFFECTING PREGNANCY IN THIRD TRIMESTER: ICD-10-CM

## 2025-08-04 DIAGNOSIS — O09.529 ANTEPARTUM MULTIGRAVIDA OF ADVANCED MATERNAL AGE: ICD-10-CM

## 2025-08-04 DIAGNOSIS — O99.283 HYPOTHYROIDISM AFFECTING PREGNANCY IN THIRD TRIMESTER: ICD-10-CM

## 2025-08-04 PROCEDURE — PNV

## 2025-08-06 ENCOUNTER — ANCILLARY PROCEDURE (OUTPATIENT)
Facility: HOSPITAL | Age: 40
End: 2025-08-06
Attending: STUDENT IN AN ORGANIZED HEALTH CARE EDUCATION/TRAINING PROGRAM
Payer: COMMERCIAL

## 2025-08-06 ENCOUNTER — ULTRASOUND (OUTPATIENT)
Facility: HOSPITAL | Age: 40
End: 2025-08-06
Payer: COMMERCIAL

## 2025-08-06 VITALS
SYSTOLIC BLOOD PRESSURE: 110 MMHG | BODY MASS INDEX: 25.94 KG/M2 | WEIGHT: 146.4 LBS | HEIGHT: 63 IN | HEART RATE: 84 BPM | DIASTOLIC BLOOD PRESSURE: 66 MMHG

## 2025-08-06 DIAGNOSIS — O09.529 ANTEPARTUM MULTIGRAVIDA OF ADVANCED MATERNAL AGE: Primary | ICD-10-CM

## 2025-08-06 DIAGNOSIS — Z3A.38 38 WEEKS GESTATION OF PREGNANCY: ICD-10-CM

## 2025-08-06 PROCEDURE — 59025 FETAL NON-STRESS TEST: CPT | Performed by: PHYSICIAN ASSISTANT

## 2025-08-06 PROCEDURE — 76816 OB US FOLLOW-UP PER FETUS: CPT | Performed by: PHYSICIAN ASSISTANT

## 2025-08-09 ENCOUNTER — ANESTHESIA (INPATIENT)
Dept: ANESTHESIOLOGY | Facility: HOSPITAL | Age: 40
End: 2025-08-09
Payer: COMMERCIAL

## 2025-08-09 ENCOUNTER — NURSE TRIAGE (OUTPATIENT)
Dept: OTHER | Facility: OTHER | Age: 40
End: 2025-08-09

## 2025-08-09 ENCOUNTER — ANESTHESIA EVENT (INPATIENT)
Dept: ANESTHESIOLOGY | Facility: HOSPITAL | Age: 40
End: 2025-08-09
Payer: COMMERCIAL

## 2025-08-09 ENCOUNTER — HOSPITAL ENCOUNTER (INPATIENT)
Facility: HOSPITAL | Age: 40
LOS: 1 days | Discharge: HOME | End: 2025-08-10
Attending: STUDENT IN AN ORGANIZED HEALTH CARE EDUCATION/TRAINING PROGRAM | Admitting: STUDENT IN AN ORGANIZED HEALTH CARE EDUCATION/TRAINING PROGRAM
Payer: COMMERCIAL

## 2025-08-09 PROBLEM — Z67.91 RH NEGATIVE STATE IN ANTEPARTUM PERIOD: Status: ACTIVE | Noted: 2025-08-09

## 2025-08-09 PROBLEM — O26.899 RH NEGATIVE STATE IN ANTEPARTUM PERIOD: Status: ACTIVE | Noted: 2025-08-09

## 2025-08-09 PROBLEM — Z3A.39 39 WEEKS GESTATION OF PREGNANCY: Status: ACTIVE | Noted: 2025-04-29

## 2025-08-09 PROBLEM — O42.90 PREMATURE RUPTURE OF MEMBRANES: Status: ACTIVE | Noted: 2025-08-09

## 2025-08-09 RX ORDER — LIDOCAINE HYDROCHLORIDE AND EPINEPHRINE 15; 5 MG/ML; UG/ML
INJECTION, SOLUTION EPIDURAL
Status: COMPLETED | OUTPATIENT
Start: 2025-08-09 | End: 2025-08-09

## 2025-08-09 RX ADMIN — LIDOCAINE HYDROCHLORIDE AND EPINEPHRINE 3 ML: 15; 5 INJECTION, SOLUTION EPIDURAL at 10:48

## 2025-08-10 PROBLEM — O42.90 PREMATURE RUPTURE OF MEMBRANES: Status: RESOLVED | Noted: 2025-08-09 | Resolved: 2025-08-10

## 2025-08-22 ENCOUNTER — TELEPHONE (OUTPATIENT)
Dept: OBGYN CLINIC | Facility: CLINIC | Age: 40
End: 2025-08-22

## (undated) DEVICE — SUT SILK 2-0 SH 30 IN K833H

## (undated) DEVICE — SUT MONOCRYL 4-0 PS-2 18 IN Y496G

## (undated) DEVICE — INTENDED FOR TISSUE SEPARATION, AND OTHER PROCEDURES THAT REQUIRE A SHARP SURGICAL BLADE TO PUNCTURE OR CUT.: Brand: BARD-PARKER SAFETY BLADES SIZE 15, STERILE

## (undated) DEVICE — TUBING SUCTION 5MM X 12 FT

## (undated) DEVICE — CONMED ACCESSORY ELECTRODE, NEEDLE WITH EXTENDED INSULATION: Brand: CONMED

## (undated) DEVICE — MEDI-VAC YANK SUCT HNDL W/TPRD BULBOUS TIP: Brand: CARDINAL HEALTH

## (undated) DEVICE — GLOVE SRG BIOGEL ECLIPSE 7

## (undated) DEVICE — GLOVE INDICATOR PI UNDERGLOVE SZ 7 BLUE

## (undated) DEVICE — REM POLYHESIVE ADULT PATIENT RETURN ELECTRODE: Brand: VALLEYLAB

## (undated) DEVICE — 3M™ STERI-STRIP™ COMPOUND BENZOIN TINCTURE 40 BAGS/CARTON 4 CARTONS/CASE C1544: Brand: 3M™ STERI-STRIP™

## (undated) DEVICE — 3000CC GUARDIAN II: Brand: GUARDIAN

## (undated) DEVICE — 3M™ STERI-STRIP™ REINFORCED ADHESIVE SKIN CLOSURES, R1547, 1/2 IN X 4 IN (12 MM X 100 MM), 6 STRIPS/ENVELOPE: Brand: 3M™ STERI-STRIP™

## (undated) DEVICE — SUT VICRYL 2-0 SH 27 IN UNDYED J417H

## (undated) DEVICE — 3M™ TEGADERM™ TRANSPARENT FILM DRESSING FRAME STYLE, 1626W, 4 IN X 4-3/4 IN (10 CM X 12 CM), 50/CT 4CT/CASE: Brand: 3M™ TEGADERM™

## (undated) DEVICE — GAUZE SPONGES,16 PLY: Brand: CURITY

## (undated) DEVICE — CHLORAPREP HI-LITE 26ML ORANGE

## (undated) DEVICE — SUT ETHILON 2-0 FSLX 30 IN 1674H

## (undated) DEVICE — STRL UNIVERSAL MINOR GENERAL: Brand: CARDINAL HEALTH

## (undated) DEVICE — SKIN MARKER DUAL TIP WITH RULER CAP, FLEXIBLE RULER AND LABELS: Brand: DEVON

## (undated) DEVICE — LIGACLIP MCA MULTIPLE CLIP APPLIERS, 20 SMALL CLIPS: Brand: LIGACLIP

## (undated) DEVICE — 3M™ STERI-STRIP™ REINFORCED ADHESIVE SKIN CLOSURES, R1546, 1/4 IN X 4 IN (6 MM X 100 MM), 10 STRIPS/ENVELOPE: Brand: 3M™ STERI-STRIP™

## (undated) DEVICE — PLUMEPEN PRO 10FT

## (undated) DEVICE — CONMED ACCESSORY ELECTRODE, FLAT BLADE WITH EXTENDED INSULATION: Brand: CONMED